# Patient Record
Sex: MALE | Race: WHITE | NOT HISPANIC OR LATINO | URBAN - METROPOLITAN AREA
[De-identification: names, ages, dates, MRNs, and addresses within clinical notes are randomized per-mention and may not be internally consistent; named-entity substitution may affect disease eponyms.]

---

## 2020-06-14 ENCOUNTER — INPATIENT (INPATIENT)
Facility: HOSPITAL | Age: 85
LOS: 1 days | Discharge: HOME | DRG: 694 | End: 2020-06-16
Attending: INTERNAL MEDICINE | Admitting: INTERNAL MEDICINE
Payer: MEDICARE

## 2020-06-14 VITALS
RESPIRATION RATE: 19 BRPM | WEIGHT: 220.02 LBS | OXYGEN SATURATION: 98 % | DIASTOLIC BLOOD PRESSURE: 87 MMHG | SYSTOLIC BLOOD PRESSURE: 168 MMHG | HEART RATE: 64 BPM | HEIGHT: 71 IN

## 2020-06-14 LAB
ALBUMIN SERPL ELPH-MCNC: 4.3 G/DL — SIGNIFICANT CHANGE UP (ref 3.3–5)
ALP SERPL-CCNC: 50 U/L — SIGNIFICANT CHANGE UP (ref 40–120)
ALT FLD-CCNC: 9 U/L — LOW (ref 10–45)
ANION GAP SERPL CALC-SCNC: 13 MMOL/L — SIGNIFICANT CHANGE UP (ref 5–17)
APPEARANCE UR: CLEAR — SIGNIFICANT CHANGE UP
AST SERPL-CCNC: 14 U/L — SIGNIFICANT CHANGE UP (ref 10–40)
BACTERIA # UR AUTO: NEGATIVE — SIGNIFICANT CHANGE UP
BASE EXCESS BLDV CALC-SCNC: -0.1 MMOL/L — SIGNIFICANT CHANGE UP (ref -2–2)
BASOPHILS # BLD AUTO: 0.05 K/UL — SIGNIFICANT CHANGE UP (ref 0–0.2)
BASOPHILS NFR BLD AUTO: 0.5 % — SIGNIFICANT CHANGE UP (ref 0–2)
BILIRUB SERPL-MCNC: 1 MG/DL — SIGNIFICANT CHANGE UP (ref 0.2–1.2)
BILIRUB UR-MCNC: NEGATIVE — SIGNIFICANT CHANGE UP
BUN SERPL-MCNC: 22 MG/DL — SIGNIFICANT CHANGE UP (ref 7–23)
CA-I SERPL-SCNC: 1.19 MMOL/L — SIGNIFICANT CHANGE UP (ref 1.12–1.3)
CALCIUM SERPL-MCNC: 9.5 MG/DL — SIGNIFICANT CHANGE UP (ref 8.4–10.5)
CHLORIDE BLDV-SCNC: 109 MMOL/L — HIGH (ref 96–108)
CHLORIDE SERPL-SCNC: 105 MMOL/L — SIGNIFICANT CHANGE UP (ref 96–108)
CO2 BLDV-SCNC: 24 MMOL/L — SIGNIFICANT CHANGE UP (ref 22–30)
CO2 SERPL-SCNC: 20 MMOL/L — LOW (ref 22–31)
COLOR SPEC: YELLOW — SIGNIFICANT CHANGE UP
CREAT SERPL-MCNC: 1.35 MG/DL — HIGH (ref 0.5–1.3)
DIFF PNL FLD: ABNORMAL
EOSINOPHIL # BLD AUTO: 0.1 K/UL — SIGNIFICANT CHANGE UP (ref 0–0.5)
EOSINOPHIL NFR BLD AUTO: 1 % — SIGNIFICANT CHANGE UP (ref 0–6)
EPI CELLS # UR: 1 /HPF — SIGNIFICANT CHANGE UP
GAS PNL BLDV: 139 MMOL/L — SIGNIFICANT CHANGE UP (ref 135–145)
GAS PNL BLDV: SIGNIFICANT CHANGE UP
GAS PNL BLDV: SIGNIFICANT CHANGE UP
GLUCOSE BLDV-MCNC: 151 MG/DL — HIGH (ref 70–99)
GLUCOSE SERPL-MCNC: 159 MG/DL — HIGH (ref 70–99)
GLUCOSE UR QL: NEGATIVE — SIGNIFICANT CHANGE UP
HCO3 BLDV-SCNC: 23 MMOL/L — SIGNIFICANT CHANGE UP (ref 21–29)
HCT VFR BLD CALC: 41.8 % — SIGNIFICANT CHANGE UP (ref 39–50)
HCT VFR BLDA CALC: 45 % — SIGNIFICANT CHANGE UP (ref 39–50)
HGB BLD CALC-MCNC: 14.8 G/DL — SIGNIFICANT CHANGE UP (ref 13–17)
HGB BLD-MCNC: 14.3 G/DL — SIGNIFICANT CHANGE UP (ref 13–17)
HYALINE CASTS # UR AUTO: 0 /LPF — SIGNIFICANT CHANGE UP (ref 0–2)
IMM GRANULOCYTES NFR BLD AUTO: 0.4 % — SIGNIFICANT CHANGE UP (ref 0–1.5)
KETONES UR-MCNC: SIGNIFICANT CHANGE UP
LACTATE BLDV-MCNC: 2.2 MMOL/L — HIGH (ref 0.7–2)
LEUKOCYTE ESTERASE UR-ACNC: NEGATIVE — SIGNIFICANT CHANGE UP
LIDOCAIN IGE QN: 33 U/L — SIGNIFICANT CHANGE UP (ref 7–60)
LYMPHOCYTES # BLD AUTO: 1.06 K/UL — SIGNIFICANT CHANGE UP (ref 1–3.3)
LYMPHOCYTES # BLD AUTO: 10.3 % — LOW (ref 13–44)
MCHC RBC-ENTMCNC: 31.1 PG — SIGNIFICANT CHANGE UP (ref 27–34)
MCHC RBC-ENTMCNC: 34.2 GM/DL — SIGNIFICANT CHANGE UP (ref 32–36)
MCV RBC AUTO: 90.9 FL — SIGNIFICANT CHANGE UP (ref 80–100)
MONOCYTES # BLD AUTO: 0.75 K/UL — SIGNIFICANT CHANGE UP (ref 0–0.9)
MONOCYTES NFR BLD AUTO: 7.3 % — SIGNIFICANT CHANGE UP (ref 2–14)
NEUTROPHILS # BLD AUTO: 8.32 K/UL — HIGH (ref 1.8–7.4)
NEUTROPHILS NFR BLD AUTO: 80.5 % — HIGH (ref 43–77)
NITRITE UR-MCNC: NEGATIVE — SIGNIFICANT CHANGE UP
NRBC # BLD: 0 /100 WBCS — SIGNIFICANT CHANGE UP (ref 0–0)
OTHER CELLS CSF MANUAL: 13 ML/DL — LOW (ref 18–22)
PCO2 BLDV: 36 MMHG — SIGNIFICANT CHANGE UP (ref 35–50)
PH BLDV: 7.42 — SIGNIFICANT CHANGE UP (ref 7.35–7.45)
PH UR: 7 — SIGNIFICANT CHANGE UP (ref 5–8)
PLATELET # BLD AUTO: 194 K/UL — SIGNIFICANT CHANGE UP (ref 150–400)
PO2 BLDV: 36 MMHG — SIGNIFICANT CHANGE UP (ref 25–45)
POTASSIUM BLDV-SCNC: 4.4 MMOL/L — SIGNIFICANT CHANGE UP (ref 3.5–5.3)
POTASSIUM SERPL-MCNC: 4.4 MMOL/L — SIGNIFICANT CHANGE UP (ref 3.5–5.3)
POTASSIUM SERPL-SCNC: 4.4 MMOL/L — SIGNIFICANT CHANGE UP (ref 3.5–5.3)
PROT SERPL-MCNC: 6.8 G/DL — SIGNIFICANT CHANGE UP (ref 6–8.3)
PROT UR-MCNC: ABNORMAL
RBC # BLD: 4.6 M/UL — SIGNIFICANT CHANGE UP (ref 4.2–5.8)
RBC # FLD: 13.7 % — SIGNIFICANT CHANGE UP (ref 10.3–14.5)
RBC CASTS # UR COMP ASSIST: 219 /HPF — HIGH (ref 0–4)
SAO2 % BLDV: 65 % — LOW (ref 67–88)
SARS-COV-2 RNA SPEC QL NAA+PROBE: SIGNIFICANT CHANGE UP
SODIUM SERPL-SCNC: 138 MMOL/L — SIGNIFICANT CHANGE UP (ref 135–145)
SP GR SPEC: 1.02 — SIGNIFICANT CHANGE UP (ref 1.01–1.02)
UROBILINOGEN FLD QL: NEGATIVE — SIGNIFICANT CHANGE UP
WBC # BLD: 10.32 K/UL — SIGNIFICANT CHANGE UP (ref 3.8–10.5)
WBC # FLD AUTO: 10.32 K/UL — SIGNIFICANT CHANGE UP (ref 3.8–10.5)
WBC UR QL: 7 /HPF — HIGH (ref 0–5)

## 2020-06-14 PROCEDURE — 71045 X-RAY EXAM CHEST 1 VIEW: CPT | Mod: 26

## 2020-06-14 PROCEDURE — 99220: CPT | Mod: CS

## 2020-06-14 PROCEDURE — 74176 CT ABD & PELVIS W/O CONTRAST: CPT | Mod: 26

## 2020-06-14 PROCEDURE — 76705 ECHO EXAM OF ABDOMEN: CPT | Mod: 26

## 2020-06-14 RX ORDER — SODIUM CHLORIDE 9 MG/ML
1000 INJECTION INTRAMUSCULAR; INTRAVENOUS; SUBCUTANEOUS
Refills: 0 | Status: DISCONTINUED | OUTPATIENT
Start: 2020-06-14 | End: 2020-06-16

## 2020-06-14 RX ORDER — ONDANSETRON 8 MG/1
4 TABLET, FILM COATED ORAL ONCE
Refills: 0 | Status: COMPLETED | OUTPATIENT
Start: 2020-06-14 | End: 2020-06-14

## 2020-06-14 RX ORDER — SODIUM CHLORIDE 9 MG/ML
3 INJECTION INTRAMUSCULAR; INTRAVENOUS; SUBCUTANEOUS EVERY 8 HOURS
Refills: 0 | Status: DISCONTINUED | OUTPATIENT
Start: 2020-06-14 | End: 2020-06-16

## 2020-06-14 RX ORDER — SODIUM CHLORIDE 9 MG/ML
1000 INJECTION INTRAMUSCULAR; INTRAVENOUS; SUBCUTANEOUS ONCE
Refills: 0 | Status: COMPLETED | OUTPATIENT
Start: 2020-06-14 | End: 2020-06-14

## 2020-06-14 RX ORDER — MORPHINE SULFATE 50 MG/1
4 CAPSULE, EXTENDED RELEASE ORAL ONCE
Refills: 0 | Status: DISCONTINUED | OUTPATIENT
Start: 2020-06-14 | End: 2020-06-14

## 2020-06-14 RX ORDER — KETOROLAC TROMETHAMINE 30 MG/ML
15 SYRINGE (ML) INJECTION ONCE
Refills: 0 | Status: DISCONTINUED | OUTPATIENT
Start: 2020-06-14 | End: 2020-06-14

## 2020-06-14 RX ADMIN — SODIUM CHLORIDE 125 MILLILITER(S): 9 INJECTION INTRAMUSCULAR; INTRAVENOUS; SUBCUTANEOUS at 15:40

## 2020-06-14 RX ADMIN — SODIUM CHLORIDE 3 MILLILITER(S): 9 INJECTION INTRAMUSCULAR; INTRAVENOUS; SUBCUTANEOUS at 22:48

## 2020-06-14 RX ADMIN — ONDANSETRON 4 MILLIGRAM(S): 8 TABLET, FILM COATED ORAL at 11:50

## 2020-06-14 RX ADMIN — MORPHINE SULFATE 4 MILLIGRAM(S): 50 CAPSULE, EXTENDED RELEASE ORAL at 10:02

## 2020-06-14 RX ADMIN — ONDANSETRON 4 MILLIGRAM(S): 8 TABLET, FILM COATED ORAL at 10:01

## 2020-06-14 RX ADMIN — Medication 15 MILLIGRAM(S): at 11:31

## 2020-06-14 RX ADMIN — SODIUM CHLORIDE 1000 MILLILITER(S): 9 INJECTION INTRAMUSCULAR; INTRAVENOUS; SUBCUTANEOUS at 10:02

## 2020-06-14 NOTE — ED ADULT NURSE REASSESSMENT NOTE - COMFORT CARE
ambulated to bathroom/plan of care explained/po fluids offered/warm blanket provided/darkened lights/repositioned

## 2020-06-14 NOTE — ED ADULT TRIAGE NOTE - SPO2 (%)
Last visit 6/10/19     Last refill on # 180 Prozac  with no refills on 7/1/19     Ok to refill again and any extra refills ?   Ready for signature.    98

## 2020-06-14 NOTE — ED CDU PROVIDER INITIAL DAY NOTE - OBJECTIVE STATEMENT
89 y.o. male history of CAD, MI, in the past, PPM coming in with left abdominal / left flank pain that woke him from sleep associated with nausea and several episodes of dry heaving.  NO fevers, no chills, no dysuria, no gross hematuria.  Never had surgery on his abdomen.  Never had anything like this before.  Took Tylenol without relief, nothing makes his symptoms worse.  In ED patient had mildly elevated creatinine of 1.35 without history of kidney disease in the past, with a mildly elevated lactate. UA unremarkable and CT showed mild to moderate L sided hydro with 2mm proximal ureteral stone. Patient sent to CDU for continued fluid hydration, pain control PRN and repeat labs.

## 2020-06-14 NOTE — ED PROVIDER NOTE - OBJECTIVE STATEMENT
89 y.o. male history of CAD, MI, in the past, PPM coming in with left abdominal / left flank pain that woke him from sleep associated with nausea and several episodes of dry heaving.  NO fevers, no chills, no dysuria, no gross hematuria.  Never had surgery on his abdomen.  Never had anything like this before.  Took Tylenol without relief, nothing makes his symptoms worse.

## 2020-06-14 NOTE — ED CDU PROVIDER INITIAL DAY NOTE - DETAILS
89M p/w L abdominal pain/flank pain x2 days with n/v, 2mm L ureteral stone  - continuous monitoring and frequent reevaluations  - IVF maintenance   - pain control PRN   - repeat BMP in AM   - d/w ED attending

## 2020-06-14 NOTE — ED ADULT NURSE REASSESSMENT NOTE - NS ED NURSE REASSESS COMMENT FT1
Received pt from ZIGGY Porter , received pt alert and responsive, oriented x4, denies any respiratory distress, SOB, or difficulty breathing. Pt transferred to CDU for abdominal pain/ L flank pain. Pt Is currently resting comfortably with no complaints. Pt has no difficulty with urination. Pending AM labs pt aware of plan.  IV in place, patent and free of signs of infiltration, V/S stable, pt afebrile, pt denies pain at this time. Pt educated on unit and unit rules, instructed patient to notify RN of any needed assistance, Pt verbalizes understanding, Call bell placed within reach. Safety maintained. Will continue to monitor.

## 2020-06-14 NOTE — ED ADULT NURSE REASSESSMENT NOTE - NS ED NURSE REASSESS COMMENT FT1
Pt given urinal, unable to urinate. MD [place] aware, OK with waiting to see if patient is able to go after finishing 1 L of NS. Will continue to monitor.

## 2020-06-14 NOTE — ED PROVIDER NOTE - ATTENDING CONTRIBUTION TO CARE
89 YOM PMH CAD on asa/plavix, HTN, PPM here with sudden onset of left flank/llq pain since 430a this morning. Woke him up from sleep. Pain is constant but comes in waves a/w dry heaving. Denies abd surgeries in the past. Denies recent travel/sick contacts. Took a tylenol around 5a with some relief. Denies f/c, vomiting, cp, sob, testicular pain, dysuria/hematuria, diarrhea.  AP - concern for renal colic vs. divertic. will get CT to evaluate. labs, pain control. reassess

## 2020-06-14 NOTE — ED ADULT NURSE REASSESSMENT NOTE - NS ED NURSE REASSESS COMMENT FT1
Pt returned from CT, reports improvement in pain, awaiting lab results. Will continue to monitor. Pt returned from CT, reports improvement in pain, awaiting CT results. Will continue to monitor.

## 2020-06-14 NOTE — ED ADULT NURSE NOTE - OBJECTIVE STATEMENT
The pt ia an 88 y/o M BIBMS coming from home, c/o 5/10 LLQ abd pain, nausea since 4:30 this morning. The pt states he has been dry heaving for one day and describes the pain as, "a nauseous feeling." Pt denies fever, chills, The pt ia an 88 y/o M PMH pacemaker on plavix and aspirin, sciatica, BIBMS coming from home, c/o 5/10 LLQ abd pain, nausea, generalized weakness since 4:30 this morning. The pt states he has been dry heaving for one day and describes the pain as, "a nauseous feeling." Pt took Tylenol at 5:30 am, but reports no pain relief. Pt denies fever, chills, vomiting, diarrhea, hematuria, urinary symptoms. Upon assessment, pt is AO x 4, clear breath sounds, s1 s2 heart sounds, abd soft and tender to the LLQ, bowel sounds present in all four quadrants, equal strength bilaterally. Fall precautions in place, side rails raised, call bell within reach, comfort measures provided, will continue to monitor.

## 2020-06-14 NOTE — ED CDU PROVIDER INITIAL DAY NOTE - PROGRESS NOTE DETAILS
CDU PROGRESS NOTE CHRISTINA LANE: Received pt at 1900 sign-out. Pt resting in stretcher in NAD. Case/plan reviewed. VSS. Pt is aox3, ambulatory around unit with steady gait. S1 S2 noted, RRR, lungs CTA b/l, BS x4 with soft, nontender abdomen. Pt without complaints, reports pa. Will continue to monitor overnight. CDU PROGRESS NOTE CHRISTINA LANE: Received pt at 1900 sign-out. Pt resting in stretcher in NAD. Case/plan reviewed. VSS. Pt is aox3, ambulatory around unit with steady gait. S1 S2 noted, RRR, lungs CTA b/l, BS x4 with soft, nontender abdomen. Pt without complaints, denies abdominal pain or nausea. Will continue to monitor overnight. CDU PROGRESS NOTE CHRISTINA LANE: Pt resting comfortably, feeling well without complaint. Pt reports having intermittent abdominal pain to left flank, but states he is currently without pain. Abdomen soft, non distended, no guarding or rebound noted. No CVAT. Pt declines pain medication at present time. Pt recommended to notified staff, if worsening pain or need for pain medication. CDU PROGRESS NOTE PA DOMINGO: Repeat Lactate from 2.2 to 1.4

## 2020-06-14 NOTE — ED ADULT NURSE NOTE - NSIMPLEMENTINTERV_GEN_ALL_ED
Implemented All Fall with Harm Risk Interventions:  [place] to call system. Call bell, personal items and telephone within reach. Instruct patient to call for assistance. Room bathroom lighting operational. Non-slip footwear when patient is off stretcher. Physically safe environment: no spills, clutter or unnecessary equipment. Stretcher in lowest position, wheels locked, appropriate side rails in place. Provide visual cue, wrist band, yellow gown, etc. Monitor gait and stability. Monitor for mental status changes and reorient to person, place, and time. Review medications for side effects contributing to fall risk. Reinforce activity limits and safety measures with patient and family. Provide visual clues: red socks.

## 2020-06-14 NOTE — ED CDU PROVIDER INITIAL DAY NOTE - ATTENDING CONTRIBUTION TO CARE
89 YOM PMH CAD, MI, PPM coming in with left flank pain that woke him up around 430a a/w episodes of dry heaving. Denies f/c, vomiting, cp, sob, dysuria, hematuria. Denies past abd surgeries.   In ED found to have 2mm proximal ureteral stone with mild hydro. requiring multiple rounds of IV pain control in ED. mildly elevated cr to 1.35 unclear baseline. CDU for continued fluid hydration, pain control

## 2020-06-15 DIAGNOSIS — N17.9 ACUTE KIDNEY FAILURE, UNSPECIFIED: ICD-10-CM

## 2020-06-15 LAB
ANION GAP SERPL CALC-SCNC: 11 MMOL/L — SIGNIFICANT CHANGE UP (ref 5–17)
BUN SERPL-MCNC: 24 MG/DL — HIGH (ref 7–23)
CALCIUM SERPL-MCNC: 8.7 MG/DL — SIGNIFICANT CHANGE UP (ref 8.4–10.5)
CHLORIDE SERPL-SCNC: 108 MMOL/L — SIGNIFICANT CHANGE UP (ref 96–108)
CO2 SERPL-SCNC: 23 MMOL/L — SIGNIFICANT CHANGE UP (ref 22–31)
CREAT SERPL-MCNC: 1.71 MG/DL — HIGH (ref 0.5–1.3)
CULTURE RESULTS: SIGNIFICANT CHANGE UP
GLUCOSE SERPL-MCNC: 133 MG/DL — HIGH (ref 70–99)
POTASSIUM SERPL-MCNC: 5.1 MMOL/L — SIGNIFICANT CHANGE UP (ref 3.5–5.3)
POTASSIUM SERPL-SCNC: 5.1 MMOL/L — SIGNIFICANT CHANGE UP (ref 3.5–5.3)
SODIUM SERPL-SCNC: 142 MMOL/L — SIGNIFICANT CHANGE UP (ref 135–145)
SPECIMEN SOURCE: SIGNIFICANT CHANGE UP

## 2020-06-15 PROCEDURE — 99217: CPT | Mod: CS

## 2020-06-15 RX ORDER — HEPARIN SODIUM 5000 [USP'U]/ML
5000 INJECTION INTRAVENOUS; SUBCUTANEOUS
Refills: 0 | Status: DISCONTINUED | OUTPATIENT
Start: 2020-06-15 | End: 2020-06-16

## 2020-06-15 RX ORDER — ACETAMINOPHEN 500 MG
650 TABLET ORAL EVERY 6 HOURS
Refills: 0 | Status: DISCONTINUED | OUTPATIENT
Start: 2020-06-15 | End: 2020-06-15

## 2020-06-15 RX ORDER — METOPROLOL TARTRATE 50 MG
25 TABLET ORAL DAILY
Refills: 0 | Status: DISCONTINUED | OUTPATIENT
Start: 2020-06-15 | End: 2020-06-16

## 2020-06-15 RX ORDER — CLOPIDOGREL BISULFATE 75 MG/1
75 TABLET, FILM COATED ORAL DAILY
Refills: 0 | Status: DISCONTINUED | OUTPATIENT
Start: 2020-06-15 | End: 2020-06-16

## 2020-06-15 RX ORDER — ACETAMINOPHEN 500 MG
650 TABLET ORAL EVERY 6 HOURS
Refills: 0 | Status: DISCONTINUED | OUTPATIENT
Start: 2020-06-15 | End: 2020-06-16

## 2020-06-15 RX ORDER — ONDANSETRON 8 MG/1
4 TABLET, FILM COATED ORAL ONCE
Refills: 0 | Status: COMPLETED | OUTPATIENT
Start: 2020-06-15 | End: 2020-06-15

## 2020-06-15 RX ORDER — SODIUM CHLORIDE 9 MG/ML
1000 INJECTION INTRAMUSCULAR; INTRAVENOUS; SUBCUTANEOUS ONCE
Refills: 0 | Status: COMPLETED | OUTPATIENT
Start: 2020-06-15 | End: 2020-06-15

## 2020-06-15 RX ORDER — TAMSULOSIN HYDROCHLORIDE 0.4 MG/1
0.4 CAPSULE ORAL AT BEDTIME
Refills: 0 | Status: DISCONTINUED | OUTPATIENT
Start: 2020-06-15 | End: 2020-06-16

## 2020-06-15 RX ORDER — OXYCODONE HYDROCHLORIDE 5 MG/1
5 TABLET ORAL EVERY 8 HOURS
Refills: 0 | Status: DISCONTINUED | OUTPATIENT
Start: 2020-06-15 | End: 2020-06-16

## 2020-06-15 RX ORDER — ASPIRIN/CALCIUM CARB/MAGNESIUM 324 MG
81 TABLET ORAL DAILY
Refills: 0 | Status: DISCONTINUED | OUTPATIENT
Start: 2020-06-15 | End: 2020-06-16

## 2020-06-15 RX ORDER — NITROGLYCERIN 6.5 MG
0.4 CAPSULE, EXTENDED RELEASE ORAL
Refills: 0 | Status: DISCONTINUED | OUTPATIENT
Start: 2020-06-15 | End: 2020-06-16

## 2020-06-15 RX ORDER — ONDANSETRON 8 MG/1
4 TABLET, FILM COATED ORAL EVERY 6 HOURS
Refills: 0 | Status: DISCONTINUED | OUTPATIENT
Start: 2020-06-15 | End: 2020-06-16

## 2020-06-15 RX ORDER — LOSARTAN POTASSIUM 100 MG/1
50 TABLET, FILM COATED ORAL DAILY
Refills: 0 | Status: DISCONTINUED | OUTPATIENT
Start: 2020-06-15 | End: 2020-06-16

## 2020-06-15 RX ORDER — HEPARIN SODIUM 5000 [USP'U]/ML
5000 INJECTION INTRAVENOUS; SUBCUTANEOUS
Refills: 0 | Status: DISCONTINUED | OUTPATIENT
Start: 2020-06-15 | End: 2020-06-15

## 2020-06-15 RX ADMIN — Medication 650 MILLIGRAM(S): at 08:59

## 2020-06-15 RX ADMIN — TAMSULOSIN HYDROCHLORIDE 0.4 MILLIGRAM(S): 0.4 CAPSULE ORAL at 01:20

## 2020-06-15 RX ADMIN — ONDANSETRON 4 MILLIGRAM(S): 8 TABLET, FILM COATED ORAL at 09:00

## 2020-06-15 RX ADMIN — SODIUM CHLORIDE 1000 MILLILITER(S): 9 INJECTION INTRAMUSCULAR; INTRAVENOUS; SUBCUTANEOUS at 09:00

## 2020-06-15 RX ADMIN — SODIUM CHLORIDE 3 MILLILITER(S): 9 INJECTION INTRAMUSCULAR; INTRAVENOUS; SUBCUTANEOUS at 21:13

## 2020-06-15 RX ADMIN — SODIUM CHLORIDE 3 MILLILITER(S): 9 INJECTION INTRAMUSCULAR; INTRAVENOUS; SUBCUTANEOUS at 06:48

## 2020-06-15 RX ADMIN — OXYCODONE HYDROCHLORIDE 5 MILLIGRAM(S): 5 TABLET ORAL at 08:59

## 2020-06-15 RX ADMIN — LOSARTAN POTASSIUM 50 MILLIGRAM(S): 100 TABLET, FILM COATED ORAL at 21:16

## 2020-06-15 RX ADMIN — SODIUM CHLORIDE 125 MILLILITER(S): 9 INJECTION INTRAMUSCULAR; INTRAVENOUS; SUBCUTANEOUS at 01:20

## 2020-06-15 RX ADMIN — Medication 25 MILLIGRAM(S): at 21:16

## 2020-06-15 RX ADMIN — CLOPIDOGREL BISULFATE 75 MILLIGRAM(S): 75 TABLET, FILM COATED ORAL at 21:16

## 2020-06-15 RX ADMIN — SODIUM CHLORIDE 125 MILLILITER(S): 9 INJECTION INTRAMUSCULAR; INTRAVENOUS; SUBCUTANEOUS at 21:16

## 2020-06-15 RX ADMIN — SODIUM CHLORIDE 125 MILLILITER(S): 9 INJECTION INTRAMUSCULAR; INTRAVENOUS; SUBCUTANEOUS at 13:43

## 2020-06-15 RX ADMIN — SODIUM CHLORIDE 3 MILLILITER(S): 9 INJECTION INTRAMUSCULAR; INTRAVENOUS; SUBCUTANEOUS at 13:44

## 2020-06-15 RX ADMIN — SODIUM CHLORIDE 1000 MILLILITER(S): 9 INJECTION INTRAMUSCULAR; INTRAVENOUS; SUBCUTANEOUS at 01:21

## 2020-06-15 RX ADMIN — Medication 81 MILLIGRAM(S): at 21:16

## 2020-06-15 NOTE — H&P ADULT - NSHPPHYSICALEXAM_GEN_ALL_CORE
PHYSICAL EXAMINATION:  Vital Signs Last 24 Hrs  T(C): 36.9 (15 Yvan 2020 07:35), Max: 37.1 (15 Yavn 2020 04:05)  T(F): 98.4 (15 Yvan 2020 07:35), Max: 98.7 (15 Yvan 2020 04:05)  HR: 60 (15 Yvan 2020 07:35) (58 - 69)  BP: 152/81 (15 Yvan 2020 07:35) (122/86 - 159/80)  BP(mean): --  RR: 18 (15 Yvan 2020 07:35) (16 - 18)  SpO2: 97% (15 Yvan 2020 07:35) (96% - 100%)  CAPILLARY BLOOD GLUCOSE          GENERAL: NAD, well-groomed, well-developed  HEAD:  atraumatic, normocephalic  EYES: sclera anicteric  ENMT: mucous membranes moist  NECK: supple, No JVD  CHEST/LUNG: clear to auscultation bilaterally; no rales, rhonchi, or wheezing b/l  HEART: normal S1, S2  ABDOMEN: BS+, soft, ND, NT L flank tender  EXTREMITIES:  pulses palpable; no clubbing, cyanosis, or edema b/l LEs  NEURO: awake, alert, interactive; moves all extremities  SKIN: no rashes or lesions

## 2020-06-15 NOTE — ED CDU PROVIDER SUBSEQUENT DAY NOTE - PROGRESS NOTE DETAILS
CDU PROGRESS NOTE PA DOMINGO: Pt resting comfortably, without complaint. Abdomen soft, non distended, no guarding or rebound noted. No CVAT. Will closely monitor, repeat Labs in am, PO challenge and reassess. CDU PROGRESS NOTE CHRISTINA LANE: Patient repeat laboratory worsening Cr 1.35 -1.71. Patient reports having nausea and feeling like he will vomit. Will medicate Zofran 4mg IVP. c/d/w Urology, will see patient. CDU PROGRESS NOTE CHRISTINA TIPTON: Pt resting comfortably, feeling well without complaint at this time. NAD, VSS. Seen by urology team at 0730. Reports that very unlikely patient would be stented given very small stone; however, given rising creatinine will discuss case with attending. CDU PROGRESS NOTE CHRISTINA TIPTON: Pt resting comfortably, feeling well without complaint at this time. NAD, VSS. Seen by urology team at 0730. Reports that very unlikely patient would be stented given very small stone; however, given rising creatinine will discuss case with attending. Patient reports that he has only urinated twice in past 24 hours, team informed.

## 2020-06-15 NOTE — ED CDU PROVIDER DISPOSITION NOTE - CLINICAL COURSE
9 y.o. male history of CAD, MI, in the past, PPM coming in with left abdominal / left flank pain that woke him from sleep associated with nausea and several episodes of dry heaving.  NO fevers, no chills, no dysuria, no gross hematuria.  Never had surgery on his abdomen.  Never had anything like this before.  Took Tylenol without relief, nothing makes his symptoms worse.  In ED patient had mildly elevated creatinine of 1.35 without history of kidney disease in the past, with a mildly elevated lactate. UA unremarkable and CT showed mild to moderate L sided hydro with 2mm proximal ureteral stone. Patient sent to CDU for continued fluid hydration, pain control PRN and repeat labs. Patient remained to have significant nausea in CDU with one episode of pain that resolved. In AM, patients creatinine elevated to 1.71 despite hydration and flomax. Patient evaluated by urology stating no surgical intervention at this time; however, given patient's worsening BARBIE and continued symptoms will admit to medicine for continued treatment. D/w Dr. House for admission.

## 2020-06-15 NOTE — H&P ADULT - ASSESSMENT
89 m with    L kidney stone/ Renal colic  - IVF  - pain control  - nausea control  - urology evaluation    CAD  - stable    BARBIE  - IVF  - follow Cr, lytes    Further action as per clinical course     Josep Pickard MD pager [phone]

## 2020-06-15 NOTE — H&P ADULT - NSHPLABSRESULTS_GEN_ALL_CORE
14.3   10.32 )-----------( 194      ( 2020 10:10 )             41.8       06-15    142  |  108  |  24<H>  ----------------------------<  133<H>  5.1   |  23  |  1.71<H>    Ca    8.7      15 Yvan 2020 05:04    TPro  6.8  /  Alb  4.3  /  TBili  1.0  /  DBili  x   /  AST  14  /  ALT  9<L>  /  AlkPhos  50  -14              Urinalysis Basic - ( 2020 12:16 )    Color: Yellow / Appearance: Clear / S.025 / pH: x  Gluc: x / Ketone: Trace  / Bili: Negative / Urobili: Negative   Blood: x / Protein: 100 mg/dL / Nitrite: Negative   Leuk Esterase: Negative / RBC: 219 /hpf / WBC 7 /HPF   Sq Epi: x / Non Sq Epi: 1 /hpf / Bacteria: Negative            Lactate Trend            CAPILLARY BLOOD GLUCOSE            Culture Results:   <10,000 CFU/mL Normal Urogenital Kathe ( @ 14:07)    < from: CT Abdomen and Pelvis No Cont (20 @ 10:42) >    IMPRESSION:   Mild to moderate left hydroureteronephrosis secondary to a 0.2 cm stone in the proximal left ureter.    < end of copied text >    < from: Xray Chest 1 View AP/PA (20 @ 10:14) >      Impression:    The heart is normal in size. The lungs are clear. A left phrenic angle is not included in this study. A pacer is in good position. No pneumothorax. No acute bony pathology is seen in the visualized bones.      < end of copied text >

## 2020-06-15 NOTE — CONSULT NOTE ADULT - ASSESSMENT
88yo male with left flank pain 2/2 2mm left proximal ureteral stone, pain now resolved. Intermittent nausea improved. Cr increased from 1.3 yesterday to 1.7 today. s/p toradol 15mg IV x2 and about 2L IVF over past day.   - strain urine  - continue hydration  - continue antiemetics and pain management  - no need for stent at this time  - will discuss with Dr. Dumont

## 2020-06-15 NOTE — CONSULT NOTE ADULT - SUBJECTIVE AND OBJECTIVE BOX
HPI: 88yo M h/o CAD, MI, in the past, PPM, presenting to ED yesterday with left flank pain and nausea. Took tylenol at home with no relief. States the nausea was very severe but no vomiting. could not tolerate much PO. Denies fever/chills, hematuria, dysuria, or difficulty emptying bladder. No h/o kidney stones in past.     CT shows 2mm proximal left ureteral stone. Pain currently controlled, but intermittent nausea. repeat cr 1.7 from 1.3 yesterday.   Pt received 1L IVF yesterday, currently completing another 1 L @125cc/hr.   Also received toradol 15mg IV x2 yesterday     PAST MEDICAL & SURGICAL HISTORY:  Pacemaker  Myocardial infarction  Coronary artery disease  No significant past surgical history    FAMILY HISTORY:  No pertinent family history in first degree relatives    SOCIAL HISTORY:   Tobacco hx:    MEDICATIONS  (STANDING):  sodium chloride 0.9% lock flush 3 milliLiter(s) IV Push every 8 hours  sodium chloride 0.9%. 1000 milliLiter(s) (125 mL/Hr) IV Continuous <Continuous>  tamsulosin 0.4 milliGRAM(s) Oral at bedtime    MEDICATIONS  (PRN):  acetaminophen   Tablet .. 650 milliGRAM(s) Oral every 6 hours PRN Moderate Pain (4 - 6)  oxyCODONE    IR 5 milliGRAM(s) Oral every 8 hours PRN Severe Pain (7 - 10)    Allergies    No Known Allergies    Intolerances        REVIEW OF SYSTEMS: Pertinent positives and negatives as stated in HPI, otherwise negative    Vital signs  T(C): 37.1 (06-15-20 @ 04:05), Max: 37.1 (06-15-20 @ 04:05)  HR: 59 (06-15-20 @ 04:05)  BP: 146/78 (06-15-20 @ 04:05)  SpO2: 96% (06-15-20 @ 04:05)  Wt(kg): --    Output    UOP    Physical Exam  Gen: NAD  Pulm: No respiratory distress, no subcostal retractions  CV: RRR, no JVD  Abd: Soft, NT, ND, no CVAT  : voiding     LABS:          06-15 @ 05:04    WBC --    / Hct --    / SCr 1.71      @ 10:10    WBC 10.32 / Hct 41.8  / SCr 1.35     06-15    142  |  108  |  24<H>  ----------------------------<  133<H>  5.1   |  23  |  1.71<H>    Ca    8.7      15 Yvan 2020 05:04    TPro  6.8  /  Alb  4.3  /  TBili  1.0  /  DBili  x   /  AST  14  /  ALT  9<L>  /  AlkPhos  50  06-14      Urinalysis Basic - ( 2020 12:16 )    Color: Yellow / Appearance: Clear / S.025 / pH: x  Gluc: x / Ketone: Trace  / Bili: Negative / Urobili: Negative   Blood: x / Protein: 100 mg/dL / Nitrite: Negative   Leuk Esterase: Negative / RBC: 219 /hpf / WBC 7 /HPF   Sq Epi: x / Non Sq Epi: 1 /hpf / Bacteria: Negative        RADIOLOGY:  < from: CT Abdomen and Pelvis No Cont (20 @ 10:42) >  FINDINGS:  LOWER CHEST: Pacemaker leads in place.  Coronary artery calcifications/stent    LIVER: Coronary artery calcifications/stent  BILE DUCTS: Normal caliber.  GALLBLADDER: Within normal limits.  SPLEEN: Within normal limits.  PANCREAS: Within normal limits.  ADRENALS: Within normal limits.  KIDNEYS/URETERS: Mild to moderate left hydroureteronephrosis secondary toa 0.2 cm stone in the proximal left ureter. Left perinephric inflammatory change.    BLADDER: Within normal limits.  REPRODUCTIVE ORGANS: Prostate is enlarged.    BOWEL: No bowel obstruction. Colonic diverticulosis. Appendix is not visualized. No evidence of inflammation in the pericecal region.  PERITONEUM: No ascites.  VESSELS: Atherosclerotic changes.  RETROPERITONEUM/LYMPH NODES: No lymphadenopathy.    ABDOMINAL WALL: Unremarkable.  BONES: Degenerative changes.    IMPRESSION:   Mild to moderate left hydroureteronephrosis secondary to a 0.2 cm stone in the proximal left ureter.      < end of copied text >

## 2020-06-15 NOTE — ED CDU PROVIDER DISPOSITION NOTE - ATTENDING CONTRIBUTION TO CARE
Patient admitted for proximal 2mm obstructing urolithiasis and uptrending Cr.  Labs/chart/Uro notes reviewed and appreciated.  Pain free, tolerating PO, c/o mild nausea.  HDS.  Lungs CTA, abd NTND.  Receiving IV fluids.  Will admit for continued Cr monitoring.  --BMM

## 2020-06-15 NOTE — H&P ADULT - NSHPSOCIALHISTORY_GEN_ALL_CORE
Social History:    Marital Status:  (   )    (  x ) Single    (   )    (  )   Occupation:   Lives with: (  ) alone  (  ) children   (  ) spouse   (  ) parents  (x  ) other    Substance Use (street drugs): ( x ) never used  (  ) other:  Tobacco Usage:  ( x  ) never smoked   (   ) former smoker   (   ) current smoker  (     ) pack years  (        ) last cigarette date  Alcohol Usage: denies    (     ) Advanced Directives: (     ) None    (      ) DNR    (     ) DNI    (     ) Health Care Proxy:

## 2020-06-16 ENCOUNTER — TRANSCRIPTION ENCOUNTER (OUTPATIENT)
Age: 85
End: 2020-06-16

## 2020-06-16 VITALS
OXYGEN SATURATION: 98 % | DIASTOLIC BLOOD PRESSURE: 72 MMHG | RESPIRATION RATE: 18 BRPM | HEART RATE: 60 BPM | SYSTOLIC BLOOD PRESSURE: 147 MMHG | TEMPERATURE: 98 F

## 2020-06-16 LAB
ANION GAP SERPL CALC-SCNC: 8 MMOL/L — SIGNIFICANT CHANGE UP (ref 5–17)
BUN SERPL-MCNC: 18 MG/DL — SIGNIFICANT CHANGE UP (ref 7–23)
CALCIUM SERPL-MCNC: 8.6 MG/DL — SIGNIFICANT CHANGE UP (ref 8.4–10.5)
CHLORIDE SERPL-SCNC: 109 MMOL/L — HIGH (ref 96–108)
CO2 SERPL-SCNC: 22 MMOL/L — SIGNIFICANT CHANGE UP (ref 22–31)
CREAT SERPL-MCNC: 1.16 MG/DL — SIGNIFICANT CHANGE UP (ref 0.5–1.3)
GLUCOSE SERPL-MCNC: 93 MG/DL — SIGNIFICANT CHANGE UP (ref 70–99)
HCT VFR BLD CALC: 36 % — LOW (ref 39–50)
HGB BLD-MCNC: 11.6 G/DL — LOW (ref 13–17)
MCHC RBC-ENTMCNC: 30.9 PG — SIGNIFICANT CHANGE UP (ref 27–34)
MCHC RBC-ENTMCNC: 32.2 GM/DL — SIGNIFICANT CHANGE UP (ref 32–36)
MCV RBC AUTO: 95.7 FL — SIGNIFICANT CHANGE UP (ref 80–100)
NRBC # BLD: 0 /100 WBCS — SIGNIFICANT CHANGE UP (ref 0–0)
PLATELET # BLD AUTO: 140 K/UL — LOW (ref 150–400)
POTASSIUM SERPL-MCNC: 4.7 MMOL/L — SIGNIFICANT CHANGE UP (ref 3.5–5.3)
POTASSIUM SERPL-SCNC: 4.7 MMOL/L — SIGNIFICANT CHANGE UP (ref 3.5–5.3)
RBC # BLD: 3.76 M/UL — LOW (ref 4.2–5.8)
RBC # FLD: 13.9 % — SIGNIFICANT CHANGE UP (ref 10.3–14.5)
SODIUM SERPL-SCNC: 139 MMOL/L — SIGNIFICANT CHANGE UP (ref 135–145)
WBC # BLD: 6.72 K/UL — SIGNIFICANT CHANGE UP (ref 3.8–10.5)
WBC # FLD AUTO: 6.72 K/UL — SIGNIFICANT CHANGE UP (ref 3.8–10.5)

## 2020-06-16 PROCEDURE — 83605 ASSAY OF LACTIC ACID: CPT

## 2020-06-16 PROCEDURE — 96375 TX/PRO/DX INJ NEW DRUG ADDON: CPT

## 2020-06-16 PROCEDURE — 85014 HEMATOCRIT: CPT

## 2020-06-16 PROCEDURE — G0378: CPT

## 2020-06-16 PROCEDURE — 81001 URINALYSIS AUTO W/SCOPE: CPT

## 2020-06-16 PROCEDURE — 82330 ASSAY OF CALCIUM: CPT

## 2020-06-16 PROCEDURE — 97161 PT EVAL LOW COMPLEX 20 MIN: CPT

## 2020-06-16 PROCEDURE — 82803 BLOOD GASES ANY COMBINATION: CPT

## 2020-06-16 PROCEDURE — 84132 ASSAY OF SERUM POTASSIUM: CPT

## 2020-06-16 PROCEDURE — 74176 CT ABD & PELVIS W/O CONTRAST: CPT

## 2020-06-16 PROCEDURE — 87086 URINE CULTURE/COLONY COUNT: CPT

## 2020-06-16 PROCEDURE — 84295 ASSAY OF SERUM SODIUM: CPT

## 2020-06-16 PROCEDURE — 83690 ASSAY OF LIPASE: CPT

## 2020-06-16 PROCEDURE — 76705 ECHO EXAM OF ABDOMEN: CPT

## 2020-06-16 PROCEDURE — 85027 COMPLETE CBC AUTOMATED: CPT

## 2020-06-16 PROCEDURE — 96361 HYDRATE IV INFUSION ADD-ON: CPT

## 2020-06-16 PROCEDURE — 82947 ASSAY GLUCOSE BLOOD QUANT: CPT

## 2020-06-16 PROCEDURE — 96374 THER/PROPH/DIAG INJ IV PUSH: CPT

## 2020-06-16 PROCEDURE — 96376 TX/PRO/DX INJ SAME DRUG ADON: CPT

## 2020-06-16 PROCEDURE — 71045 X-RAY EXAM CHEST 1 VIEW: CPT

## 2020-06-16 PROCEDURE — 99285 EMERGENCY DEPT VISIT HI MDM: CPT

## 2020-06-16 PROCEDURE — 82435 ASSAY OF BLOOD CHLORIDE: CPT

## 2020-06-16 PROCEDURE — 80048 BASIC METABOLIC PNL TOTAL CA: CPT

## 2020-06-16 PROCEDURE — 80053 COMPREHEN METABOLIC PANEL: CPT

## 2020-06-16 RX ORDER — ONDANSETRON 8 MG/1
4 TABLET, FILM COATED ORAL
Qty: 2 | Refills: 0
Start: 2020-06-16 | End: 2020-06-17

## 2020-06-16 RX ORDER — POLYETHYLENE GLYCOL 3350 17 G/17G
17 POWDER, FOR SOLUTION ORAL DAILY
Refills: 0 | Status: DISCONTINUED | OUTPATIENT
Start: 2020-06-16 | End: 2020-06-16

## 2020-06-16 RX ORDER — SENNA PLUS 8.6 MG/1
2 TABLET ORAL AT BEDTIME
Refills: 0 | Status: DISCONTINUED | OUTPATIENT
Start: 2020-06-16 | End: 2020-06-16

## 2020-06-16 RX ADMIN — POLYETHYLENE GLYCOL 3350 17 GRAM(S): 17 POWDER, FOR SOLUTION ORAL at 05:45

## 2020-06-16 RX ADMIN — SODIUM CHLORIDE 125 MILLILITER(S): 9 INJECTION INTRAMUSCULAR; INTRAVENOUS; SUBCUTANEOUS at 04:12

## 2020-06-16 RX ADMIN — SENNA PLUS 2 TABLET(S): 8.6 TABLET ORAL at 05:45

## 2020-06-16 RX ADMIN — SODIUM CHLORIDE 3 MILLILITER(S): 9 INJECTION INTRAMUSCULAR; INTRAVENOUS; SUBCUTANEOUS at 05:07

## 2020-06-16 RX ADMIN — SODIUM CHLORIDE 3 MILLILITER(S): 9 INJECTION INTRAMUSCULAR; INTRAVENOUS; SUBCUTANEOUS at 12:22

## 2020-06-16 NOTE — PHYSICAL THERAPY INITIAL EVALUATION ADULT - PRECAUTIONS/LIMITATIONS, REHAB EVAL
89 y.o. male history of CAD, MI, in the past, PPM coming in with left abdominal / left flank pain that woke him from sleep associated with nausea and several episodes of dry heaving.  NO fevers, no chills, no dysuria, no gross hematuria.  Never had surgery on his abdomen.  Never had anything like this before.  Took Tylenol without relief, nothing makes his symptoms worse. fall precautions/89 y.o. male history of CAD, MI, in the past, PPM coming in with left abdominal / left flank pain that woke him from sleep associated with nausea and several episodes of dry heaving.  NO fevers, no chills, no dysuria, no gross hematuria.  Never had surgery on his abdomen.  Never had anything like this before.  Took Tylenol without relief, nothing makes his symptoms worse.

## 2020-06-16 NOTE — DISCHARGE NOTE PROVIDER - HOSPITAL COURSE
· Assessment        89yo male with left flank pain 2/2 2mm left proximal ureteral stone, pain now resolved. Intermittent nausea improved.         - strain urine    - continue hydration    - continue antiemetics and pain management    - BARBIE resolved, stable for discharge    - Follow-up with [facility] in 3-5 weeks for ultrasound        [facility]    [address] [place] [id number] [phone] · Assessment        88yo male with left flank pain 2/2 2mm left proximal ureteral stone, pain now resolved. Intermittent nausea improved.         - strain urine    - continue hydration    - BARBIE resolved, stable for discharge    - Follow-up with [facility] in 3-5 weeks for ultrasound        [facility]    [address] [place], [place] [id number] [phone]

## 2020-06-16 NOTE — PHYSICAL THERAPY INITIAL EVALUATION ADULT - PLANNED THERAPY INTERVENTIONS, PT EVAL
balance training/gait training/strengthening/stair train GOAL : pt will negotiate a full flight of steps with HR step over step in 2 weeks independent

## 2020-06-16 NOTE — DISCHARGE NOTE PROVIDER - CARE PROVIDER_API CALL
Supriya Dumont  UROLOGY  [address] [place], [place] [id number]  Phone: [phone]  Fax: [phone]  Follow Up Time:

## 2020-06-16 NOTE — DISCHARGE NOTE PROVIDER - NSDCCPCAREPLAN_GEN_ALL_CORE_FT
PRINCIPAL DISCHARGE DIAGNOSIS  Diagnosis: Nephrolithiasis  Assessment and Plan of Treatment: strain  urine , continue hydration      SECONDARY DISCHARGE DIAGNOSES  Diagnosis: Nephrolithiasis  Assessment and Plan of Treatment:

## 2020-06-16 NOTE — PROGRESS NOTE ADULT - SUBJECTIVE AND OBJECTIVE BOX
afebrile, BARBIE resolved    T(F): 97.9, Max: 98.7 (06-15-20 @ 20:30)  HR: 60  BP: 144/74  SpO2: 98%    OUT:    Voided: 1350 mL  Total OUT: 1350 mL        Gen NAD  Abd soft, NTND   no CVAT      06-16 @ 06:50    WBC 6.72  / Hct 36.0  / SCr --       06-16 @ 06:49    WBC --    / Hct --    / SCr 1.16     .Urine Clean Catch (Midstream)  06-14  <10,000 CFU/mL Normal Urogenital Kathe

## 2020-06-16 NOTE — PROGRESS NOTE ADULT - ASSESSMENT
89 m with    L kidney stone/ Renal colic resolved  - pain control  - nausea control  - urology evaluation noted    CAD  - stable    BARBIE resolved  - follow Cr, lytes    DC home . Follow with PMD and Urology in 3-4 days. QA     Josep Pickard MD pager [phone]

## 2020-06-16 NOTE — DISCHARGE NOTE PROVIDER - NSDCMRMEDTOKEN_GEN_ALL_CORE_FT
Aspirin Enteric Coated 81 mg oral delayed release tablet: 1 tab(s) orally once a day  losartan 50 mg oral tablet: 1 tab(s) orally once a day  nitroglycerin 0.4 mg sublingual tablet: 1 tab(s) sublingual , As Needed  Plavix 75 mg oral tablet: 1 tab(s) orally once a day  Pravachol 40 mg oral tablet: 1 tab(s) orally once a day  Toprol-XL 25 mg oral tablet, extended release: 1 tab(s) orally once a day Aspirin Enteric Coated 81 mg oral delayed release tablet: 1 tab(s) orally once a day  losartan 50 mg oral tablet: 1 tab(s) orally once a day  nitroglycerin 0.4 mg sublingual tablet: 1 tab(s) sublingual , As Needed  ondansetron 4 mg oral tablet: 4 tab(s) orally once a day   Plavix 75 mg oral tablet: 1 tab(s) orally once a day  Pravachol 40 mg oral tablet: 1 tab(s) orally once a day  Toprol-XL 25 mg oral tablet, extended release: 1 tab(s) orally once a day

## 2020-06-16 NOTE — CHART NOTE - NSCHARTNOTEFT_GEN_A_CORE
As per Dr Pickard patient made ready for discharge.  Vital signs stable. Patient is stable and aware of plan for discharge.  Patient does not need any new medication for discharge.   Gaby Perrin ANP-C As per Dr Pickard patient made ready for discharge.  Vital signs stable. Patient is stable and aware of plan for discharge.  Patient does not need any new medication for discharge.   Gaby Perrin ANP-C    ADD: as per Urology pt may continue anti- emetics. I will send Zofran 4mg 1x/day 2 tab for nausea.   Pt may take Tylenol for pain. As per Dr Pickard patient made ready for discharge.  Vital signs stable. Patient is stable and aware of plan for discharge.  Patient does not need any new medication for discharge.   Gaby Perrin ANP-C    ADD: as per Urology pt may continue anti- emetics. I will send Zofran 4mg 1x/day 2 tab for nausea.   Pt may take Tylenol for pain.    ADD: I spoke to Pharmacist - clarified Zofran rx is 4mg # 2 tablets 1x/ day prn nausea , no refill.

## 2020-06-16 NOTE — PHYSICAL THERAPY INITIAL EVALUATION ADULT - ADDITIONAL COMMENTS
LIVES WITH SIGNIFICANT OTHER Eric Av [phone] in [facility] ; 1 FOS outside and chairlift to main floor of condo ; pt independent with amb PTA and adl's ; decline ID caregiver LIVES WITH SIGNIFICANT OTHER Eric Av [phone] in condo ; 1 FOS outside w/ HR and then chairlift to main floor of condo ; pt independent with amb PTA and adl's  no device ; decline ID caregiver

## 2020-06-16 NOTE — PHYSICAL THERAPY INITIAL EVALUATION ADULT - ASSISTIVE DEVICE FOR TRANSFER: GAIT, REHAB EVAL
no AD at first for 50 ft pt min unsteady throughout cg to close supervision ; pt then amb with std cane 100 ft x 2 steady gait independent

## 2020-06-16 NOTE — PHYSICAL THERAPY INITIAL EVALUATION ADULT - IMPAIRED TRANSFERS: SIT/STAND, REHAB EVAL
decreased strength/impaired balance/pt sit-stand x 3 steady at std cane independent , w/o device supervision

## 2020-06-16 NOTE — DISCHARGE NOTE NURSING/CASE MANAGEMENT/SOCIAL WORK - PATIENT PORTAL LINK FT
You can access the FollowMyHealth Patient Portal offered by [facility] by registering at the following website: [email or web address]. By joining [facility]’s FollowMyHealth portal, you will also be able to view your health information using other applications (apps) compatible with our system.

## 2020-06-16 NOTE — PHYSICAL THERAPY INITIAL EVALUATION ADULT - STAIR PATTERN, REHAB EVAL
step over step/step to step/pt indepenent with HR and std cane when do one step at a time step top step pt understood ; pt need cg of1 when try to go step over step mild unsteady with HR /std cane pt understood

## 2020-06-16 NOTE — PROGRESS NOTE ADULT - SUBJECTIVE AND OBJECTIVE BOX
Patient is a 89y old  Male who presents with a chief complaint of abdominal pain (16 Jun 2020 11:05)      SUBJECTIVE / OVERNIGHT EVENTS: Comfortable without new complaints. Passed stone during night.  Review of Systems  chest pain no  palpitations no  sob no  nausea no  headache no    MEDICATIONS  (STANDING):  aspirin enteric coated 81 milliGRAM(s) Oral daily  clopidogrel Tablet 75 milliGRAM(s) Oral daily  heparin   Injectable 5000 Unit(s) SubCutaneous two times a day  losartan 50 milliGRAM(s) Oral daily  metoprolol succinate ER 25 milliGRAM(s) Oral daily  sodium chloride 0.9% lock flush 3 milliLiter(s) IV Push every 8 hours  sodium chloride 0.9%. 1000 milliLiter(s) (125 mL/Hr) IV Continuous <Continuous>  tamsulosin 0.4 milliGRAM(s) Oral at bedtime    MEDICATIONS  (PRN):  acetaminophen   Tablet .. 650 milliGRAM(s) Oral every 6 hours PRN Moderate Pain (4 - 6)  nitroglycerin     SubLingual 0.4 milliGRAM(s) SubLingual every 5 minutes PRN Chest Pain  ondansetron Injectable 4 milliGRAM(s) IV Push every 6 hours PRN Nausea and/or Vomiting  oxyCODONE    IR 5 milliGRAM(s) Oral every 8 hours PRN Severe Pain (7 - 10)  polyethylene glycol 3350 17 Gram(s) Oral daily PRN Constipation  senna 2 Tablet(s) Oral at bedtime PRN Constipation      Vital Signs Last 24 Hrs  T(C): 36.7 (16 Jun 2020 12:39), Max: 37.1 (15 Yvan 2020 20:30)  T(F): 98 (16 Jun 2020 12:39), Max: 98.7 (15 Yvan 2020 20:30)  HR: 60 (16 Jun 2020 12:39) (60 - 72)  BP: 147/72 (16 Jun 2020 12:39) (130/72 - 165/85)  BP(mean): --  RR: 18 (16 Jun 2020 12:39) (17 - 18)  SpO2: 98% (16 Jun 2020 12:39) (95% - 98%)    PHYSICAL EXAM:  GENERAL: NAD, well-developed  HEAD:  Atraumatic, Normocephalic  EYES: EOMI, PERRLA, conjunctiva and sclera clear  NECK: Supple, No JVD  CHEST/LUNG: Clear to auscultation bilaterally; No wheeze  HEART: Regular rate and rhythm; No murmurs, rubs, or gallops  ABDOMEN: Soft, Nontender, Nondistended; Bowel sounds present  EXTREMITIES:  2+ Peripheral Pulses, No clubbing, cyanosis, or edema  PSYCH: AAOx3  NEUROLOGY: non-focal  SKIN: No rashes or lesions    LABS:                        11.6   6.72  )-----------( 140      ( 16 Jun 2020 06:50 )             36.0     06-16    139  |  109<H>  |  18  ----------------------------<  93  4.7   |  22  |  1.16    Ca    8.6      16 Jun 2020 06:49                Culture - Urine (collected 14 Jun 2020 14:07)  Source: .Urine Clean Catch (Midstream)  Final Report (15 Yvan 2020 09:56):    <10,000 CFU/mL Normal Urogenital Kathe        RADIOLOGY & ADDITIONAL TESTS:    Imaging Personally Reviewed:    Consultant(s) Notes Reviewed:      Care Discussed with Consultants/Other Providers:

## 2020-06-16 NOTE — PHYSICAL THERAPY INITIAL EVALUATION ADULT - GENERAL OBSERVATIONS, REHAB EVAL
pt received in bed nad no longer with flank pain ; pt report 6 weeks of sciatic discomfort not pain but feels a little weaker ; was doing virtual therapy on TV for therex at home

## 2020-06-16 NOTE — PHYSICAL THERAPY INITIAL EVALUATION ADULT - DISCHARGE DISPOSITION, PT EVAL
home w/ home PT/and transition to Outpt PT to work on balance, strength , increase fxl mobiltiy , fall prevention education continued

## 2020-06-16 NOTE — PHYSICAL THERAPY INITIAL EVALUATION ADULT - IMPAIRMENTS CONTRIBUTING TO GAIT DEVIATIONS, PT EVAL
impaired postural control/decreased strength/decreased endurance , see gait comments above/impaired balance

## 2020-06-16 NOTE — PROGRESS NOTE ADULT - ASSESSMENT
88yo male with left flank pain 2/2 2mm left proximal ureteral stone, pain now resolved. Intermittent nausea improved.     - strain urine  - continue hydration  - continue antiemetics and pain management  - BARBIE resolved, stable for discharge  - Follow-up with [facility] in 3-5 weeks for ultrasound    [facility]  [address] [place] [phone]

## 2020-06-16 NOTE — PHYSICAL THERAPY INITIAL EVALUATION ADULT - GAIT DEVIATIONS NOTED, PT EVAL
decreased el/increased time in double stance/decreased weight-shifting ability/decreased stride length/decreased step length

## 2020-06-17 PROBLEM — I21.9 ACUTE MYOCARDIAL INFARCTION, UNSPECIFIED: Chronic | Status: ACTIVE | Noted: 2020-06-14

## 2020-06-22 ENCOUNTER — APPOINTMENT (OUTPATIENT)
Dept: UROLOGY | Facility: CLINIC | Age: 85
End: 2020-06-22
Payer: MEDICARE

## 2020-06-22 VITALS
HEART RATE: 60 BPM | HEIGHT: 72 IN | WEIGHT: 200 LBS | SYSTOLIC BLOOD PRESSURE: 128 MMHG | BODY MASS INDEX: 27.09 KG/M2 | DIASTOLIC BLOOD PRESSURE: 73 MMHG

## 2020-06-22 VITALS — TEMPERATURE: 98.4 F

## 2020-06-22 DIAGNOSIS — Z86.79 PERSONAL HISTORY OF OTHER DISEASES OF THE CIRCULATORY SYSTEM: ICD-10-CM

## 2020-06-22 DIAGNOSIS — Z82.3 FAMILY HISTORY OF STROKE: ICD-10-CM

## 2020-06-22 DIAGNOSIS — I25.10 ATHEROSCLEROTIC HEART DISEASE OF NATIVE CORONARY ARTERY W/OUT ANGINA PECTORIS: ICD-10-CM

## 2020-06-22 PROCEDURE — 99204 OFFICE O/P NEW MOD 45 MIN: CPT

## 2020-06-22 NOTE — PHYSICAL EXAM
[General Appearance - Well Nourished] : well nourished [General Appearance - Well Developed] : well developed [General Appearance - In No Acute Distress] : no acute distress [Normal Appearance] : normal appearance [Well Groomed] : well groomed [Edema] : no peripheral edema [Respiration, Rhythm And Depth] : normal respiratory rhythm and effort [Exaggerated Use Of Accessory Muscles For Inspiration] : no accessory muscle use [Abdomen Soft] : soft [Costovertebral Angle Tenderness] : no ~M costovertebral angle tenderness [Abdomen Tenderness] : non-tender [Normal Station and Gait] : the gait and station were normal for the patient's age [] : no rash [Oriented To Time, Place, And Person] : oriented to person, place, and time

## 2020-06-22 NOTE — ASSESSMENT
[FreeTextEntry1] : \par \par Impression/plan: 89-year-old gentleman with 2 mm proximal ureteral stone.  He is currently without any symptoms.  Possibly may have passed stone.  He has not been straining so no evidence.  Recommend repeat CT scan in a week or 2 to establish passage of stone.  If he does become symptomatic in any way including pain, nausea, vomiting or fever chills he should either report to the office or directly to the emergency room.

## 2020-06-22 NOTE — HISTORY OF PRESENT ILLNESS
[FreeTextEntry1] : 89-year-old gentleman seen a little over a week ago for a 2 mm left proximal stone with some mild pain and nausea/vomiting.  He was observed in the emergency room and discharged after lab work showed creatinine at 1.16 and his nausea vomiting resolved.  His pain was also resolved.  Over the last week he has not been straining his urine, however states he no longer has pain or nausea/vomiting.  He denies any irritative or obstructive voiding symptoms.  He denies any fever chills.  Urine culture was negative.

## 2020-06-22 NOTE — REVIEW OF SYSTEMS
[Negative] : Heme/Lymph [Feeling Poorly] : feeling poorly [Feeling Tired] : feeling tired [Pain during urination] : pain during urination [Wake up at night to urinate  How many times?  ___] : wakes up to urinate [unfilled] times during the night [Diarrhea] : diarrhea [FreeTextEntry2] : neuropathy

## 2020-07-06 ENCOUNTER — OUTPATIENT (OUTPATIENT)
Dept: OUTPATIENT SERVICES | Facility: HOSPITAL | Age: 85
LOS: 1 days | End: 2020-07-06
Payer: MEDICARE

## 2020-07-06 ENCOUNTER — APPOINTMENT (OUTPATIENT)
Dept: CT IMAGING | Facility: IMAGING CENTER | Age: 85
End: 2020-07-06
Payer: MEDICARE

## 2020-07-06 ENCOUNTER — RESULT REVIEW (OUTPATIENT)
Age: 85
End: 2020-07-06

## 2020-07-06 DIAGNOSIS — Z00.00 ENCOUNTER FOR GENERAL ADULT MEDICAL EXAMINATION WITHOUT ABNORMAL FINDINGS: ICD-10-CM

## 2020-07-06 DIAGNOSIS — N20.0 CALCULUS OF KIDNEY: ICD-10-CM

## 2020-07-06 PROCEDURE — 74176 CT ABD & PELVIS W/O CONTRAST: CPT | Mod: 26

## 2020-07-06 PROCEDURE — 74176 CT ABD & PELVIS W/O CONTRAST: CPT

## 2020-07-07 ENCOUNTER — TRANSCRIPTION ENCOUNTER (OUTPATIENT)
Age: 85
End: 2020-07-07

## 2020-07-13 ENCOUNTER — APPOINTMENT (OUTPATIENT)
Dept: UROLOGY | Facility: CLINIC | Age: 85
End: 2020-07-13
Payer: MEDICARE

## 2020-07-13 VITALS — TEMPERATURE: 98.1 F

## 2020-07-13 VITALS — DIASTOLIC BLOOD PRESSURE: 72 MMHG | HEART RATE: 60 BPM | RESPIRATION RATE: 16 BRPM | SYSTOLIC BLOOD PRESSURE: 136 MMHG

## 2020-07-13 PROCEDURE — 99213 OFFICE O/P EST LOW 20 MIN: CPT

## 2020-08-04 ENCOUNTER — APPOINTMENT (OUTPATIENT)
Dept: ORTHOPEDIC SURGERY | Facility: CLINIC | Age: 85
End: 2020-08-04
Payer: MEDICARE

## 2020-08-04 VITALS
HEART RATE: 60 BPM | HEIGHT: 72 IN | WEIGHT: 200 LBS | DIASTOLIC BLOOD PRESSURE: 68 MMHG | BODY MASS INDEX: 27.09 KG/M2 | SYSTOLIC BLOOD PRESSURE: 127 MMHG

## 2020-08-04 VITALS — TEMPERATURE: 97.9 F

## 2020-08-04 DIAGNOSIS — M25.572 PAIN IN LEFT ANKLE AND JOINTS OF LEFT FOOT: ICD-10-CM

## 2020-08-04 DIAGNOSIS — M62.89 OTHER SPECIFIED DISORDERS OF MUSCLE: ICD-10-CM

## 2020-08-04 DIAGNOSIS — S99.912A UNSPECIFIED INJURY OF LEFT ANKLE, INITIAL ENCOUNTER: ICD-10-CM

## 2020-08-04 PROCEDURE — 73630 X-RAY EXAM OF FOOT: CPT | Mod: LT

## 2020-08-04 PROCEDURE — 99203 OFFICE O/P NEW LOW 30 MIN: CPT

## 2020-08-04 PROCEDURE — 73600 X-RAY EXAM OF ANKLE: CPT | Mod: LT

## 2020-08-09 PROBLEM — M62.89 TIGHTNESS OF BOTH GASTROCNEMIUS MUSCLES: Status: ACTIVE | Noted: 2020-08-09

## 2020-08-09 PROBLEM — S99.912A ANKLE INJURY, LEFT, INITIAL ENCOUNTER: Status: ACTIVE | Noted: 2020-08-09

## 2020-08-10 ENCOUNTER — APPOINTMENT (OUTPATIENT)
Dept: UROLOGY | Facility: CLINIC | Age: 85
End: 2020-08-10
Payer: MEDICARE

## 2020-08-10 VITALS — TEMPERATURE: 98.4 F

## 2020-08-10 PROCEDURE — 99213 OFFICE O/P EST LOW 20 MIN: CPT

## 2020-08-10 NOTE — HISTORY OF PRESENT ILLNESS
[FreeTextEntry1] : see notes from Dr. Dumont\par first episode of stones\par passed a 2mm stone\par no family history\par CT images reviewed: punctate non-obstructing left lower pole stone remaining.\par \par \par no need for surgical intervention\par Plan\par General kidney stone diet prevention discussed:\par Increase fluid intake to at least 2.5 liters of fluids a day\par Limit animal protein intake\par Low salt diet\par Printed guide/pamphlets provided\par f/u 6 months\par Renal sono with next visit\par

## 2021-01-14 ENCOUNTER — APPOINTMENT (OUTPATIENT)
Dept: PHYSICAL MEDICINE AND REHAB | Facility: CLINIC | Age: 86
End: 2021-01-14
Payer: MEDICARE

## 2021-01-14 VITALS
TEMPERATURE: 96.4 F | SYSTOLIC BLOOD PRESSURE: 127 MMHG | HEART RATE: 60 BPM | DIASTOLIC BLOOD PRESSURE: 61 MMHG | OXYGEN SATURATION: 96 %

## 2021-01-14 DIAGNOSIS — M54.16 RADICULOPATHY, LUMBAR REGION: ICD-10-CM

## 2021-01-14 PROCEDURE — 99203 OFFICE O/P NEW LOW 30 MIN: CPT

## 2021-01-14 NOTE — HISTORY OF PRESENT ILLNESS
[FreeTextEntry1] : Patient is an 89 year old man who presents for evaluation, accompanied by his wife. He reports recent episode of pain traveling down left posterior thigh to left calf, resolved with virtual PT, stretching at home, around that time, he also twisted his left ankle. He did in person physical therapy for the ankle, most of pain has resolved. Patient complains of worsening numbness in b/l feet, feet feel "padded", walks with stomping, feels tired and legs weak going up stairs. Left foot more numb than right, numbness is not painful, but feels worse since June. He denies falling, catching his toes when walking, but feels he has a limp, and he is looking down at feet or holding on to something when walking. No muscle weakness reported, but change in sensations. Patient was previously more active, these changes have limited his mobility. He does not use a cane. He does not take any medications for pain, no changes in bowel/bladder control, +recent weight loss 220-195 lbs.

## 2021-01-14 NOTE — ASSESSMENT
[FreeTextEntry1] : 89 year old man with symptoms of LE sensory neuropathy, radiculopathy\par MRI lumbar spine w/o contrast\par EMG LE to evaluate neuropathy\par patient to start PT for balance, evaluation for AD, HEP\par no medications were prescribed\par follow up in one month

## 2021-01-14 NOTE — PHYSICAL EXAM
[de-identified] : Alert, NAD [de-identified] : well perfused  [de-identified] : breathing comfortably [de-identified] : no spinal tenderness, no pain with lumbar flexion or extension  [de-identified] : strength intact, able to heel/toe walk with assist, slightly ataxic gait  [de-identified] : diminished reflexes, decreased proprioception on left, decreased sensation to light touch b/l feet, +romberg's  [de-identified] : normal affect

## 2021-01-14 NOTE — REVIEW OF SYSTEMS
[Fever] : no fever [Chills] : no chills [Fatigue] : no fatigue [Earache] : no earache [Chest Pain] : no chest pain [Palpitations] : no palpitations [Shortness Of Breath] : no shortness of breath [Wheezing] : no wheezing [Cough] : no cough [Abdominal Pain] : no abdominal pain [Incontinence] : no incontinence [Joint Pain] : no joint pain [Joint Stiffness] : no joint stiffness [Headache] : no headaches [Dizziness] : no dizziness [Fainting] : no fainting [Insomnia] : no insomnia [Anxiety] : no anxiety

## 2021-01-14 NOTE — PHYSICAL EXAM
[de-identified] : Alert, NAD [de-identified] : breathing comfortably [de-identified] : well perfused  [de-identified] : no spinal tenderness, no pain with lumbar flexion or extension  [de-identified] : strength intact, able to heel/toe walk with assist, slightly ataxic gait  [de-identified] : diminished reflexes, decreased proprioception on left, decreased sensation to light touch b/l feet, +romberg's  [de-identified] : normal affect

## 2021-01-21 ENCOUNTER — APPOINTMENT (OUTPATIENT)
Dept: PHYSICAL MEDICINE AND REHAB | Facility: CLINIC | Age: 86
End: 2021-01-21
Payer: MEDICARE

## 2021-01-21 VITALS
SYSTOLIC BLOOD PRESSURE: 127 MMHG | OXYGEN SATURATION: 100 % | DIASTOLIC BLOOD PRESSURE: 75 MMHG | HEART RATE: 60 BPM | TEMPERATURE: 97.2 F

## 2021-01-21 DIAGNOSIS — G57.92 UNSPECIFIED MONONEUROPATHY OF LEFT LOWER LIMB: ICD-10-CM

## 2021-01-21 PROCEDURE — 95909 NRV CNDJ TST 5-6 STUDIES: CPT

## 2021-02-10 ENCOUNTER — APPOINTMENT (OUTPATIENT)
Dept: UROLOGY | Facility: CLINIC | Age: 86
End: 2021-02-10

## 2021-11-19 NOTE — INPATIENT CERTIFICATION FOR MEDICARE PATIENTS - THE SEVERITY OF SIGNS/SYMPTOMS. (SEE ED/ADMIT DOCUMENTS)
Patient given water and crackers, pt able to have a bite of a cracker and states he is too nauseated to eat more  
1. The severity of signs/symptoms.(See ED/admit documents)

## 2022-06-07 NOTE — ED CDU PROVIDER SUBSEQUENT DAY NOTE - RESPIRATORY NEGATIVE STATEMENT, MLM
6yF with HbSS and recent admission for acute chest syndrome in 03/2022 presents with cough and chest pain. Cough began two days ago, is nonproductive, associated with chest pain and 1 episode of post-tussive emesis. Also complains of abdominal pain this morning with 2 episodes of watery diarrhea in the past 24 hours. Sick contact is cousin with similar cough. Denies other URI symptoms, signs of VOE. IUTD. Outpatient hematologist is Jose MARTINEZ.    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 No pets no smokers.    ED Course: tachypneic, tachycardic. WBC 12 Hgb 7.5 Hct 20.7. CMP bicarb 19. CXR b/l airspace opacities. RVP +paraflu. Started ceftriaxone and azithromycin, given 6cc/kg RBC transfusion. Ibuprofen ATC for pain.
no chest pain, no cough, and no shortness of breath.

## 2022-07-19 ENCOUNTER — APPOINTMENT (OUTPATIENT)
Dept: UROLOGY | Facility: CLINIC | Age: 87
End: 2022-07-19

## 2022-07-19 VITALS
DIASTOLIC BLOOD PRESSURE: 82 MMHG | OXYGEN SATURATION: 98 % | SYSTOLIC BLOOD PRESSURE: 154 MMHG | HEART RATE: 53 BPM | RESPIRATION RATE: 17 BRPM | HEIGHT: 69 IN | TEMPERATURE: 97.8 F

## 2022-07-19 PROCEDURE — 99214 OFFICE O/P EST MOD 30 MIN: CPT

## 2022-07-23 LAB
AFP-TM SERPL-MCNC: 2.7 NG/ML
ALBUMIN SERPL ELPH-MCNC: 4.5 G/DL
ALP BLD-CCNC: 54 U/L
ALT SERPL-CCNC: 21 U/L
ANION GAP SERPL CALC-SCNC: 14 MMOL/L
APPEARANCE: CLEAR
AST SERPL-CCNC: 26 U/L
BACTERIA UR CULT: NORMAL
BACTERIA: NEGATIVE
BASOPHILS # BLD AUTO: 0.05 K/UL
BASOPHILS NFR BLD AUTO: 0.8 %
BILIRUB SERPL-MCNC: 1 MG/DL
BILIRUBIN URINE: NEGATIVE
BLOOD URINE: NEGATIVE
BUN SERPL-MCNC: 21 MG/DL
CALCIUM SERPL-MCNC: 9.9 MG/DL
CHLORIDE SERPL-SCNC: 106 MMOL/L
CO2 SERPL-SCNC: 23 MMOL/L
COLOR: YELLOW
CREAT SERPL-MCNC: 1.15 MG/DL
EGFR: 60 ML/MIN/1.73M2
EOSINOPHIL # BLD AUTO: 0.21 K/UL
EOSINOPHIL NFR BLD AUTO: 3.2 %
GLUCOSE QUALITATIVE U: NEGATIVE
GLUCOSE SERPL-MCNC: 102 MG/DL
HCG-TM SERPL-MCNC: 2 MIU/ML
HCT VFR BLD CALC: 38.7 %
HGB BLD-MCNC: 12.6 G/DL
HYALINE CASTS: 1 /LPF
IMM GRANULOCYTES NFR BLD AUTO: 0.2 %
KETONES URINE: NEGATIVE
LEUKOCYTE ESTERASE URINE: NEGATIVE
LYMPHOCYTES # BLD AUTO: 1.05 K/UL
LYMPHOCYTES NFR BLD AUTO: 16.1 %
MAGNESIUM SERPL-MCNC: 2.1 MG/DL
MAN DIFF?: NORMAL
MCHC RBC-ENTMCNC: 31.2 PG
MCHC RBC-ENTMCNC: 32.6 GM/DL
MCV RBC AUTO: 95.8 FL
MICROSCOPIC-UA: NORMAL
MONOCYTES # BLD AUTO: 0.69 K/UL
MONOCYTES NFR BLD AUTO: 10.6 %
NEUTROPHILS # BLD AUTO: 4.5 K/UL
NEUTROPHILS NFR BLD AUTO: 69.1 %
NITRITE URINE: NEGATIVE
OSMOLALITY SERPL: 292 MOSMOL/KG
PH URINE: 5.5
PHOSPHATE SERPL-MCNC: 3.7 MG/DL
PLATELET # BLD AUTO: 190 K/UL
POTASSIUM SERPL-SCNC: 5.1 MMOL/L
PROT SERPL-MCNC: 6.6 G/DL
PROTEIN URINE: NORMAL
PSA FREE FLD-MCNC: 31 %
PSA FREE SERPL-MCNC: 0.74 NG/ML
PSA SERPL-MCNC: 2.42 NG/ML
RBC # BLD: 4.04 M/UL
RBC # FLD: 14.2 %
RED BLOOD CELLS URINE: 2 /HPF
SODIUM SERPL-SCNC: 142 MMOL/L
SPECIFIC GRAVITY URINE: 1.02
SQUAMOUS EPITHELIAL CELLS: 0 /HPF
URATE SERPL-MCNC: 5.2 MG/DL
URINE CYTOLOGY: NORMAL
UROBILINOGEN URINE: NORMAL
WBC # FLD AUTO: 6.51 K/UL
WHITE BLOOD CELLS URINE: 2 /HPF

## 2022-07-25 LAB
TESTOST FREE SERPL-MCNC: 0.8 PG/ML
TESTOST SERPL-MCNC: 78.7 NG/DL

## 2022-07-25 NOTE — ASSESSMENT
[FreeTextEntry1] : 07/19/2022:  is a 89 y/o M that presents today for an evaluation of enlarged testicle. He reports that his left testicle was became enlarged about 5 weeks ago. There is no pain associated with this. Denies any groin pain. Denied doing any heavy lifting at the time it started to occur. The pt was told by his cardiologist a few years ago, Dr.Gary Hernandez at [facility], that he has an enlarged prostate but this hasn't bothered him. He denies burning, gross hematuria, pushing, straining, urinary frequency, and urinary urgency. Has constipation but manages with with maalox. 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 Has 7 stents. Takes plavix. Pt was  but his wife passed away. He has been with a domestic partner for about 20 years. His partner has a hx of a lung transplant done at [place] and is currently hospitalized at [facility]. Pt is sexually active every once in a while. The patient does not have any significant health problems. Does have neuropathy. +PMHx of kidney stones, only one episode and passed the stone within a day and a half. Drinks about 2 500 ml water bottles a day, a 500 ml bottle of Gatorade, and one cup of coffee in 24 hours. 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 \par \par PE: Pt is circumcised. Most of the penile shaft is buried in upper part of the scrotum in the mons pubis. Varicocele on the left. Right testes is a normal size, about 18 cc, non tender, no masses, epididymis is non tender without masses. No right varicocele. No inguinal hernias. No femoral adenopathy. Left hemiscrotum is enlarged. It feels it has fluid in it, like a hydrocele. Posterior portion is palpable, non tender. Epididymis is non tender. I cannot feel the lateral or anterior aspects. Compliance is high but as you press further, it decreases rapidly. Consistent with a hydrocele. It is all non tender, not inflamed. Scrotal skin is not red or warm. \par \par The standing and bent over position was utilized for the CLARY. Digital rectal exam found no suspicious rectal masses. Normal seminal vesicles. Anal tone is normal. The prostate is non tender, with normal texture, discrete borders, and no nodules. It is a 35 gram transurethral resection size. No rectal mucosal lesions. No gross blood on the examining finger. \par \par The patient produced a urine sample which will be sent for urinalysis, urine cytology, and urine culture. \par \par Blood work today included alpha fetoprotein, CBC wit diff, CMP, HCG, lactate dehydrogenase, magnesium, serum osmolality, phosphorus, PSA profile, testosterone free and total, and uric acid serum. \par \par I advised the patient to increase his liquid intake by adding one more 500 ml water bottle to his daily consumption. \par \par I am sending the patient for a scrotal US. \par \par Patient will RTO after he obtains the scrotal US to review the results. \par \par Preparation, in person office visit, and coordination of care: 45 minutes.

## 2022-07-25 NOTE — PHYSICAL EXAM
[General Appearance - Well Developed] : well developed [General Appearance - Well Nourished] : well nourished [Normal Appearance] : normal appearance [Well Groomed] : well groomed [General Appearance - In No Acute Distress] : no acute distress [Edema] : no peripheral edema [Respiration, Rhythm And Depth] : normal respiratory rhythm and effort [Exaggerated Use Of Accessory Muscles For Inspiration] : no accessory muscle use [Abdomen Soft] : soft [Abdomen Tenderness] : non-tender [Costovertebral Angle Tenderness] : no ~M costovertebral angle tenderness [Normal Station and Gait] : the gait and station were normal for the patient's age [] : no rash [No Focal Deficits] : no focal deficits [Oriented To Time, Place, And Person] : oriented to person, place, and time [Affect] : the affect was normal [Mood] : the mood was normal [Not Anxious] : not anxious [No Palpable Adenopathy] : no palpable adenopathy [FreeTextEntry1] : PE: Pt is circumcised. Most of the penile shaft is buried in upper part of the scrotum in the mons pubis. Varicocele on the left. Right testes is a normal size, about 18 cc, non tender, no masses, epididymis is non tender without masses. No right varicocele. No inguinal hernias. No femoral adenopathy. Left hemiscrotum is enlarged. It feels it has fluid in it, like a hydrocele. Posterior portion is palpable, non tender. Epididymis is non tender. I cannot feel the lateral or anterior aspects. Compliance is high but as you press further , it decreases rapidly. Consistent with a hydrocele. It is all non tender, not inflamed. Scrotal skin is not red or warm. \par \par The standing and bent over position was utilized for the CLARY. Digital rectal exam found no suspicious rectal masses. Normal seminal vesicles. Anal tone is normal. The prostate is non tender, with normal texture, discrete borders, and no nodules. It is a 35 gram transurethral resection size. No rectal mucosal lesions. No gross blood on the examining finger.

## 2022-07-25 NOTE — ADDENDUM
[FreeTextEntry1] : This note was authored by Melita Hodges working as a scribe for Dr.Gary Gerber. I, Dr. Ten Gerber have reviewed the content of this note and confirm it is true and accurate. I personally performed the history and physical examination and made all the decisions 07/19/2022.

## 2022-07-25 NOTE — HISTORY OF PRESENT ILLNESS
[FreeTextEntry1] : 07/19/2022:  is a 89 y/o M that presents today for an evaluation of enlarged testicle. He reports that his left testicle was became enlarged about 5 weeks ago. There is no pain associated with this. Denies any groin pain. Denied doing any heavy lifting at the time it started to occur. The pt was told by his cardiologist a few years ago, Dr.Gary Hernandez at [facility], that he has an enlarged prostate but this hasn't bothered him. He denies burning, gross hematuria, pushing, straining, urinary frequency, and urinary urgency. Has constipation but manages with with maalox. 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 Has 7 stents. Takes plavix. Pt was  but his wife passed away. He has been with a domestic partner for about 20 years. His partner has a hx of a lung transplant done at [place] and is currently hospitalized at [facility]. Pt is sexually active every once in a while. The patient does not have any significant health problems. Does have neuropathy. +PMHx of kidney stones, only one episode and passed the stone within a day and a half. Drinks about 2 500 ml water bottles a day, a 500 ml bottle of Gatorade, and one cup of coffee in 24 hours. 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 \par

## 2022-07-27 ENCOUNTER — APPOINTMENT (OUTPATIENT)
Dept: ULTRASOUND IMAGING | Facility: CLINIC | Age: 87
End: 2022-07-27

## 2022-07-27 ENCOUNTER — OUTPATIENT (OUTPATIENT)
Dept: OUTPATIENT SERVICES | Facility: HOSPITAL | Age: 87
LOS: 1 days | End: 2022-07-27
Payer: MEDICARE

## 2022-07-27 DIAGNOSIS — N43.3 HYDROCELE, UNSPECIFIED: ICD-10-CM

## 2022-07-27 LAB — LDH SERPL-CCNC: 378 U/L

## 2022-07-27 PROCEDURE — 76870 US EXAM SCROTUM: CPT | Mod: 26

## 2022-07-27 PROCEDURE — 76870 US EXAM SCROTUM: CPT

## 2022-07-28 ENCOUNTER — APPOINTMENT (OUTPATIENT)
Dept: UROLOGY | Facility: CLINIC | Age: 87
End: 2022-07-28

## 2022-07-29 ENCOUNTER — APPOINTMENT (OUTPATIENT)
Dept: UROLOGY | Facility: CLINIC | Age: 87
End: 2022-07-29

## 2022-07-29 PROCEDURE — 99443: CPT | Mod: 95

## 2022-07-30 ENCOUNTER — APPOINTMENT (OUTPATIENT)
Dept: CT IMAGING | Facility: IMAGING CENTER | Age: 87
End: 2022-07-30

## 2022-07-30 ENCOUNTER — OUTPATIENT (OUTPATIENT)
Dept: OUTPATIENT SERVICES | Facility: HOSPITAL | Age: 87
LOS: 1 days | End: 2022-07-30
Payer: MEDICARE

## 2022-07-30 DIAGNOSIS — D49.59 NEOPLASM OF UNSPECIFIED BEHAVIOR OF OTHER GENITOURINARY ORGAN: ICD-10-CM

## 2022-07-30 PROCEDURE — 71260 CT THORAX DX C+: CPT

## 2022-07-30 PROCEDURE — 74178 CT ABD&PLV WO CNTR FLWD CNTR: CPT | Mod: 26,MH

## 2022-07-30 PROCEDURE — 71260 CT THORAX DX C+: CPT | Mod: 26,MH

## 2022-07-30 PROCEDURE — 99443: CPT | Mod: 95

## 2022-07-30 PROCEDURE — 74178 CT ABD&PLV WO CNTR FLWD CNTR: CPT

## 2022-07-31 NOTE — ASSESSMENT
[FreeTextEntry1] : 07/19/2022:  is a 89 y/o M that presents today for an evaluation of enlarged testicle. He reports that his left testicle was became enlarged about 5 weeks ago. There is no pain associated with this. Denies any groin pain. Denied doing any heavy lifting at the time it started to occur. The pt was told by his cardiologist a few years ago, Dr.Gary Hernandez at [facility], that he has an enlarged prostate but this hasn't bothered him. He denies burning, gross hematuria, pushing, straining, urinary frequency, and urinary urgency. Has constipation but manages with with maalox. 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 Has 7 stents. Takes plavix. Pt was  but his wife passed away. He has been with a domestic partner for about 20 years. His partner has a hx of a lung transplant done at [place] and is currently hospitalized at [facility]. Pt is sexually active every once in a while. The patient does not have any significant health problems. Does have neuropathy. +PMHx of kidney stones, only one episode and passed the stone within a day and a half. Drinks about 2 500 ml water bottles a day, a 500 ml bottle of Gatorade, and one cup of coffee in 24 hours. 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 \par \par PE: Pt is circumcised. Most of the penile shaft is buried in upper part of the scrotum in the mons pubis. Varicocele on the left. Right testes is a normal size, about 18 cc, non tender, no masses, epididymis is non tender without masses. No right varicocele. No inguinal hernias. No femoral adenopathy. Left hemiscrotum is enlarged. It feels it has fluid in it, like a hydrocele. Posterior portion is palpable, non tender. Epididymis is non tender. I cannot feel the lateral or anterior aspects. Compliance is high but as you press further, it decreases rapidly. Consistent with a hydrocele. It is all non tender, not inflamed. Scrotal skin is not red or warm. \par The standing and bent over position was utilized for the CLARY. Digital rectal exam found no suspicious rectal masses. Normal seminal vesicles. Anal tone is normal. The prostate is non tender, with normal texture, discrete borders, and no nodules. It is a 35 gram transurethral resection size. No rectal mucosal lesions. No gross blood on the examining finger. \par The patient produced a urine sample which will be sent for urinalysis, urine cytology, and urine culture. \par Blood work today included alpha fetoprotein, CBC wit diff, CMP, HCG, lactate dehydrogenase, magnesium, serum osmolality, phosphorus, PSA profile, testosterone free and total, and uric acid serum. \par I advised the patient to increase his liquid intake by adding one more 500 ml water bottle to his daily consumption. \par I am sending the patient for a scrotal US. \par Patient will RTO after he obtains the scrotal US to review the results. \par \par 07/28/2022:  was scheduled for a telehealth visit today. Unfortunately, I was not able to see the patient. Pt was called by my office staff and informed of this. He was very upset and says he has been trying to call my office for days and trying to get in touch with me. My office staff apologized and assured him he would be rescheduled for tomorrow. \par \par 07/29/2022:  presents today for a follow up audio only telehealth visit. He is accompanied by his daughter. I apologized profusely for not being able to see him yesterday and for his multiple attempts to reach me as I was unaware of these attempts. He was receptive and understood. Pt obtained a scrotal US on 7/27/2022. 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 Left testis measures 6.9 cm x 4.9 cm x 5.6 cm. Right testis is 4.3 cm x 2.1 cm x  2.6 cm. Multiple low-attenuation vascular right testicular foci. Differential diagnosis includes leukemia/lymphoma or metastasis from primary neoplasm such as prostate. Primary left testicular neoplasm may also be considered but less likely in view of the findings in the right testis. Alpha fetoprotein was within normal range. HCG tumor marker was 2 where it should be below 1. This is a slight elevation. Lactate dehydrogenase was substantially elevated. It was these findings that reinforced my request of the US that was ordered on day of physical exam. The patient's testosterone level was low, total 78.7 and free at 0.8. Pt originally noticed the testicle started to become enlarged about 6 weeks ago. It has been fairly stable in size, maybe some minimal growth. \par \par Clinical findings and US results were reviewed at length with the patient. Lab work of 7/18/2022 was explained it detail as well. While this could be a germ cell neoplasm, it is unlikely at his age. If it is spermatocytic seminoma, one does not see focal vascular lesions in contralateral testes. Prostate cancer can metastasize to testes and produce such a picture. I explained that it is quite possible that this is a leukemia or lymphoma. Given these possibilities, I believe the place to start is with a CT chest abdomen and pelvis to check for enlarged lymph nodes. If there was no sign of regional or distal spread I recommend inguinal orchiectomy to establish diagnosis. If there are bony metastases, I would also get NM bone scan. After explaining all of this, his daughter recapped what we discussed so she understood well. Pt's daughter is from [place] and wanted to understand the timeline of things. \par \par Patient will schedule a telehealth visit a few days after he obtains the CT so we can review and discuss the results. \par \par Duration of telephone visit: 22 minutes.

## 2022-07-31 NOTE — HISTORY OF PRESENT ILLNESS
[FreeTextEntry1] : 07/19/2022:  is a 89 y/o M that presents today for an evaluation of enlarged testicle. He reports that his left testicle was became enlarged about 5 weeks ago. There is no pain associated with this. Denies any groin pain. Denied doing any heavy lifting at the time it started to occur. The pt was told by his cardiologist a few years ago, Dr.Gary Hernandez at [facility], that he has an enlarged prostate but this hasn't bothered him. He denies burning, gross hematuria, pushing, straining, urinary frequency, and urinary urgency. Has constipation but manages with with maalox. 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 Has 7 stents. Takes plavix. Pt was  but his wife passed away. He has been with a domestic partner for about 20 years. His partner has a hx of a lung transplant done at [place] and is currently hospitalized at [facility]. Pt is sexually active every once in a while. The patient does not have any significant health problems. Does have neuropathy. +PMHx of kidney stones, only one episode and passed the stone within a day and a half. Drinks about 2 500 ml water bottles a day, a 500 ml bottle of Gatorade, and one cup of coffee in 24 hours. 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 \par \par 07/28/2022:  was scheduled for a telehealth visit today. Unfortunately, I was not able to see the patient. Pt was called by my office staff and informed of this. He was very upset and says he has been trying to call my office for days and trying to get in touch with me. My office staff apologized and assured him he would be rescheduled for tomorrow. \par \par 07/29/2022:  presents today for a follow up audio only telehealth visit. He is accompanied by his daughter. I apologized profusely for not being able to see him yesterday and for his multiple attempts to reach me as I was unaware of these attempts. He was receptive and understood. Pt obtained a scrotal US on 7/27/2022. 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 Left testis measures 6.9 cm x 4.9 cm x 5.6 cm. Right testis is 4.3 cm x 2.1 cm x  2.6 cm. Multiple low-attenuation vascular right testicular foci. Differential diagnosis includes leukemia/lymphoma or metastasis from primary neoplasm such as prostate. Primary left testicular neoplasm may also be considered but less likely in view of the findings in the right testis. Alpha fetoprotein was within normal range. HCG tumor marker was 2 where it should be below 1. This is a slight elevation. Lactate dehydrogenase was substantially elevated. It was these findings that reinforced my request of the US that was ordered on day of physical exam. The patient's testosterone level was low, total 78.7 and free at 0.8. Pt originally noticed the testicle started to become enlarged about 6 weeks ago. It has been fairly stable in size, maybe some minimal growth.

## 2022-07-31 NOTE — ADDENDUM
[FreeTextEntry1] : This note was authored by Melita Hodges working as a scribe for Dr.Gary Gerber. I, Dr. Ten Gerber have reviewed the content of this note and confirm it is true and accurate. I personally performed the history and physical examination and made all the decisions 07/29/2022.

## 2022-08-01 ENCOUNTER — APPOINTMENT (OUTPATIENT)
Dept: UROLOGY | Facility: CLINIC | Age: 87
End: 2022-08-01

## 2022-08-02 ENCOUNTER — APPOINTMENT (OUTPATIENT)
Dept: UROLOGY | Facility: CLINIC | Age: 87
End: 2022-08-02

## 2022-08-02 PROCEDURE — 99215 OFFICE O/P EST HI 40 MIN: CPT | Mod: 95

## 2022-08-02 PROCEDURE — G2212 PROLONG OUTPT/OFFICE VIS: CPT | Mod: 95

## 2022-08-02 NOTE — ADDENDUM
[FreeTextEntry1] : This note was authored by Melita Hodges working as a scribe for Dr.Gary Gerber. I, Dr. Ten Gerber have reviewed the content of this note and confirm it is true and accurate. I personally performed the history and physical examination and made all the decisions 08/02/2022.

## 2022-08-05 NOTE — HISTORY OF PRESENT ILLNESS
[FreeTextEntry1] : 08/2/2022: Patient and daughter in the patient's home gave consent to audiovisual telehealth visit. I, Ten Gerber MD, PhD was in my Office in [place], [place].\par \par see complete note in other note of same date.\par \par Re: MIRA BLACKMON (MIRA BLACKMON G) \par 28-Apr-1931(89y)M\par Left hydrocele precluded palpation of anterior and lateral aspect of enlarged left testis. 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 CLARY normal texture prostate, no nodules. PSA=2.42, KCZ=933 (), AFP=2.7 ng/ml (<=8.3,), HCG =2  mIU/ml ( <=1), CBC slight anemia, WBC count and differential Normal. CT Chest abdomen and pelvis no adenopathy, no metastases, no identifiable primary malignancy.\par \par Findings reviewed with patient and daughter. I suggested left radical orchiectomy with frozen section. If frozen section shows malignancy then right radical orchiectomy if benign biopsy right testis. Patient not interested in testicular prosthesis.\par \par Patient referred to Dr. Bruno Haywood.\par \par Ten Gerber\par

## 2022-08-05 NOTE — ASSESSMENT
[FreeTextEntry1] : 07/19/2022:  is a 89 y/o M that presents today for an evaluation of enlarged testicle. He reports that his left testicle was became enlarged about 5 weeks ago. There is no pain associated with this. Denies any groin pain. Denied doing any heavy lifting at the time it started to occur. The pt was told by his cardiologist a few years ago, Dr.Gary Hernandez at [facility], that he has an enlarged prostate but this hasn't bothered him. He denies burning, gross hematuria, pushing, straining, urinary frequency, and urinary urgency. Has constipation but manages with with maalox. 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 Has 7 stents. Takes plavix. Pt was  but his wife passed away. He has been with a domestic partner for about 20 years. His partner has a hx of a lung transplant done at [place] and is currently hospitalized at [facility]. Pt is sexually active every once in a while. The patient does not have any significant health problems. Does have neuropathy. +PMHx of kidney stones, only one episode and passed the stone within a day and a half. Drinks about 2 500 ml water bottles a day, a 500 ml bottle of Gatorade, and one cup of coffee in 24 hours. 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 \par \par PE: Pt is circumcised. Most of the penile shaft is buried in upper part of the scrotum in the mons pubis. Varicocele on the left. Right testes is a normal size, about 18 cc, non tender, no masses, epididymis is non tender without masses. No right varicocele. No inguinal hernias. No femoral adenopathy. Left hemiscrotum is enlarged. It feels it has fluid in it, like a hydrocele. Posterior portion is palpable, non tender. Epididymis is non tender. I cannot feel the lateral or anterior aspects. Compliance is high but as you press further, it decreases rapidly. Consistent with a hydrocele. It is all non tender, not inflamed. Scrotal skin is not red or warm. \par The standing and bent over position was utilized for the CLARY. Digital rectal exam found no suspicious rectal masses. Normal seminal vesicles. Anal tone is normal. The prostate is non tender, with normal texture, discrete borders, and no nodules. It is a 35 gram transurethral resection size. No rectal mucosal lesions. No gross blood on the examining finger. \par The patient produced a urine sample which will be sent for urinalysis, urine cytology, and urine culture. \par Blood work today included alpha fetoprotein, CBC wit diff, CMP, HCG, lactate dehydrogenase, magnesium, serum osmolality, phosphorus, PSA profile, testosterone free and total, and uric acid serum. \par I advised the patient to increase his liquid intake by adding one more 500 ml water bottle to his daily consumption. \par I am sending the patient for a scrotal US. \par Patient will RTO after he obtains the scrotal US to review the results. \par \par 07/28/2022:  was scheduled for a telehealth visit today. Unfortunately, I was not able to see the patient. Pt was called by my office staff and informed of this. He was very upset and says he has been trying to call my office for days and trying to get in touch with me. My office staff apologized and assured him he would be rescheduled for tomorrow.

## 2022-08-05 NOTE — ADDENDUM
[FreeTextEntry1] : This note was authored by Melita Hodges working as a scribe for Dr.Gary Gerber. I, Dr. Ten Gerber have reviewed the content of this note and confirm it is true and accurate. I personally performed the history and physical examination and made all the decisions 07/28/2022.

## 2022-08-05 NOTE — HISTORY OF PRESENT ILLNESS
[FreeTextEntry1] : 08/2/2022: Patient and daughter in the patient's home gave consent to audiovisual telehealth visit. I, Ten Gerber MD, PhD was in my Office in [place], [place].\par \par see complete note in other note of same date.\par \par Re: MIRA BLACKMON (MIRA BLACKMON G) \par 28-Apr-1931(89y)M\par Left hydrocele precluded palpation of anterior and lateral aspect of enlarged left testis. 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 CLARY normal texture prostate, no nodules. PSA=2.42, GZK=746 (), AFP=2.7 ng/ml (<=8.3,), HCG =2  mIU/ml ( <=1), CBC slight anemia, WBC count and differential Normal. CT Chest abdomen and pelvis no adenopathy, no metastases, no identifiable primary malignancy.\par \par Findings reviewed with patient and daughter. I suggested left radical orchiectomy with frozen section. If frozen section shows malignancy then right radical orchiectomy if benign biopsy right testis. Patient not interested in testicular prosthesis.\par \par Patient referred to Dr. Bruno Haywood.\par \par Ten Gerber\par

## 2022-08-05 NOTE — ASSESSMENT
[FreeTextEntry1] : 8/2/2022: \par \par Re: MIRA BLACKMON (MIRA BLACKMON) \par 28-Apr-1931(89y)M\par Left hydrocele precluded palpation of anterior and lateral aspect of enlarged left testis. 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 CLARY normal texture prostate, no nodules. PSA=2.42, PHH=233 (), AFP=2.7 ng/ml (<=8.3,), HCG =2  mIU/ml ( <=1), CBC slight anemia, WBC count and differential Normal. CT Chest abdomen and pelvis no adenopathy, no metastases, no identifiable primary malignancy.\par \par Findings reviewed with patient and daughter. I suggested left radical orchiectomy with frozen section. If frozen section shows malignancy then right radical orchiectomy if benign biopsy right testis. Patient not interested in testicular prosthesis.\par \par Patient referred to Dr. Bruno Haywood.\par \par Preparation, audiovisual telehealth visit, coordination of care took 75 minutes.\par \par Ten Gerber\par

## 2022-08-05 NOTE — ASSESSMENT
[FreeTextEntry1] : 8/2/2022: \par \par Re: MIRA BLACKMON (MIRA BLACKMON) \par 28-Apr-1931(89y)M\par Left hydrocele precluded palpation of anterior and lateral aspect of enlarged left testis. 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 CLARY normal texture prostate, no nodules. PSA=2.42, TYO=713 (), AFP=2.7 ng/ml (<=8.3,), HCG =2  mIU/ml ( <=1), CBC slight anemia, WBC count and differential Normal. CT Chest abdomen and pelvis no adenopathy, no metastases, no identifiable primary malignancy.\par \par Findings reviewed with patient and daughter. I suggested left radical orchiectomy with frozen section. If frozen section shows malignancy then right radical orchiectomy if benign biopsy right testis. Patient not interested in testicular prosthesis.\par \par Patient referred to Dr. Bruno Haywood.\par \par Preparation, audiovisual telehealth visit, coordination of care took 75 minutes.\par \par Ten Gerber\par

## 2022-08-05 NOTE — HISTORY OF PRESENT ILLNESS
[FreeTextEntry1] : 07/19/2022:  is a 89 y/o M that presents today for an evaluation of enlarged testicle. He reports that his left testicle was became enlarged about 5 weeks ago. There is no pain associated with this. Denies any groin pain. Denied doing any heavy lifting at the time it started to occur. The pt was told by his cardiologist a few years ago, Dr.Gary Hernandez at [facility], that he has an enlarged prostate but this hasn't bothered him. He denies burning, gross hematuria, pushing, straining, urinary frequency, and urinary urgency. Has constipation but manages with with maalox. 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 Has 7 stents. Takes plavix. Pt was  but his wife passed away. He has been with a domestic partner for about 20 years. His partner has a hx of a lung transplant done at [place] and is currently hospitalized at [facility]. Pt is sexually active every once in a while. The patient does not have any significant health problems. Does have neuropathy. +PMHx of kidney stones, only one episode and passed the stone within a day and a half. Drinks about 2 500 ml water bottles a day, a 500 ml bottle of Gatorade, and one cup of coffee in 24 hours. 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 \par \par 07/28/2022:  was scheduled for a telehealth visit today. Unfortunately, I was not able to see the patient. Pt was called by my office staff and informed of this. He was very upset and says he has been trying to call my office for days and trying to get in touch with me. My office staff apologized and assured him he would be rescheduled for tomorrow. \par

## 2022-08-08 ENCOUNTER — APPOINTMENT (OUTPATIENT)
Dept: UROLOGY | Facility: CLINIC | Age: 87
End: 2022-08-08

## 2022-08-08 VITALS
RESPIRATION RATE: 17 BRPM | BODY MASS INDEX: 27.09 KG/M2 | SYSTOLIC BLOOD PRESSURE: 144 MMHG | HEIGHT: 72 IN | WEIGHT: 200 LBS | DIASTOLIC BLOOD PRESSURE: 72 MMHG | HEART RATE: 60 BPM

## 2022-08-08 PROCEDURE — 99214 OFFICE O/P EST MOD 30 MIN: CPT

## 2022-08-08 RX ORDER — ROSUVASTATIN CALCIUM 20 MG/1
20 TABLET, FILM COATED ORAL
Qty: 90 | Refills: 0 | Status: ACTIVE | COMMUNITY
Start: 2022-02-01

## 2022-08-08 RX ORDER — LOSARTAN POTASSIUM 100 MG/1
TABLET, FILM COATED ORAL
Refills: 0 | Status: ACTIVE | COMMUNITY

## 2022-08-08 RX ORDER — METOPROLOL SUCCINATE 200 MG/1
TABLET, EXTENDED RELEASE ORAL
Refills: 0 | Status: ACTIVE | COMMUNITY

## 2022-08-08 RX ORDER — CLOPIDOGREL 75 MG/1
TABLET, FILM COATED ORAL
Refills: 0 | Status: ACTIVE | COMMUNITY

## 2022-08-08 RX ORDER — KETOROLAC TROMETHAMINE 10 MG/1
10 TABLET, FILM COATED ORAL 3 TIMES DAILY
Qty: 15 | Refills: 0 | Status: COMPLETED | COMMUNITY
Start: 2020-06-18 | End: 2022-08-08

## 2022-08-15 ENCOUNTER — APPOINTMENT (OUTPATIENT)
Dept: UROLOGY | Facility: CLINIC | Age: 87
End: 2022-08-15

## 2022-08-16 ENCOUNTER — OUTPATIENT (OUTPATIENT)
Dept: OUTPATIENT SERVICES | Facility: HOSPITAL | Age: 87
LOS: 1 days | End: 2022-08-16

## 2022-08-16 VITALS
RESPIRATION RATE: 14 BRPM | WEIGHT: 199.96 LBS | HEART RATE: 69 BPM | TEMPERATURE: 98 F | SYSTOLIC BLOOD PRESSURE: 126 MMHG | HEIGHT: 72 IN | DIASTOLIC BLOOD PRESSURE: 76 MMHG | OXYGEN SATURATION: 99 %

## 2022-08-16 DIAGNOSIS — N43.3 HYDROCELE, UNSPECIFIED: ICD-10-CM

## 2022-08-16 DIAGNOSIS — D49.59 NEOPLASM OF UNSPECIFIED BEHAVIOR OF OTHER GENITOURINARY ORGAN: ICD-10-CM

## 2022-08-16 LAB
ANION GAP SERPL CALC-SCNC: 10 MMOL/L — SIGNIFICANT CHANGE UP (ref 7–14)
BUN SERPL-MCNC: 23 MG/DL — SIGNIFICANT CHANGE UP (ref 7–23)
CALCIUM SERPL-MCNC: 9.8 MG/DL — SIGNIFICANT CHANGE UP (ref 8.4–10.5)
CHLORIDE SERPL-SCNC: 104 MMOL/L — SIGNIFICANT CHANGE UP (ref 98–107)
CO2 SERPL-SCNC: 26 MMOL/L — SIGNIFICANT CHANGE UP (ref 22–31)
CREAT SERPL-MCNC: 1.09 MG/DL — SIGNIFICANT CHANGE UP (ref 0.5–1.3)
EGFR: 64 ML/MIN/1.73M2 — SIGNIFICANT CHANGE UP
GLUCOSE SERPL-MCNC: 96 MG/DL — SIGNIFICANT CHANGE UP (ref 70–99)
HCT VFR BLD CALC: 37 % — LOW (ref 39–50)
HGB BLD-MCNC: 12.3 G/DL — LOW (ref 13–17)
MCHC RBC-ENTMCNC: 31.4 PG — SIGNIFICANT CHANGE UP (ref 27–34)
MCHC RBC-ENTMCNC: 33.2 GM/DL — SIGNIFICANT CHANGE UP (ref 32–36)
MCV RBC AUTO: 94.4 FL — SIGNIFICANT CHANGE UP (ref 80–100)
NRBC # BLD: 0 /100 WBCS — SIGNIFICANT CHANGE UP (ref 0–0)
NRBC # FLD: 0 K/UL — SIGNIFICANT CHANGE UP (ref 0–0)
PLATELET # BLD AUTO: 191 K/UL — SIGNIFICANT CHANGE UP (ref 150–400)
POTASSIUM SERPL-MCNC: 4 MMOL/L — SIGNIFICANT CHANGE UP (ref 3.5–5.3)
POTASSIUM SERPL-SCNC: 4 MMOL/L — SIGNIFICANT CHANGE UP (ref 3.5–5.3)
RBC # BLD: 3.92 M/UL — LOW (ref 4.2–5.8)
RBC # FLD: 14.2 % — SIGNIFICANT CHANGE UP (ref 10.3–14.5)
SODIUM SERPL-SCNC: 140 MMOL/L — SIGNIFICANT CHANGE UP (ref 135–145)
WBC # BLD: 6.17 K/UL — SIGNIFICANT CHANGE UP (ref 3.8–10.5)
WBC # FLD AUTO: 6.17 K/UL — SIGNIFICANT CHANGE UP (ref 3.8–10.5)

## 2022-08-16 PROCEDURE — 93010 ELECTROCARDIOGRAM REPORT: CPT

## 2022-08-16 RX ORDER — LOSARTAN POTASSIUM 100 MG/1
1 TABLET, FILM COATED ORAL
Qty: 0 | Refills: 0 | DISCHARGE

## 2022-08-16 RX ORDER — CLOPIDOGREL BISULFATE 75 MG/1
1 TABLET, FILM COATED ORAL
Qty: 0 | Refills: 0 | DISCHARGE

## 2022-08-16 RX ORDER — SODIUM CHLORIDE 9 MG/ML
1000 INJECTION, SOLUTION INTRAVENOUS
Refills: 0 | Status: DISCONTINUED | OUTPATIENT
Start: 2022-08-22 | End: 2022-08-23

## 2022-08-16 RX ORDER — METOPROLOL TARTRATE 50 MG
1 TABLET ORAL
Qty: 0 | Refills: 0 | DISCHARGE

## 2022-08-16 RX ORDER — ASPIRIN/CALCIUM CARB/MAGNESIUM 324 MG
1 TABLET ORAL
Qty: 0 | Refills: 0 | DISCHARGE

## 2022-08-16 NOTE — H&P PST ADULT - CARDIOVASCULAR
details… normal/regular rate and rhythm/S1 S2 present/no gallops/no rub/no murmur/no JVD/normal PMI/no pedal edema

## 2022-08-16 NOTE — H&P PST ADULT - PROBLEM SELECTOR PLAN 1
Pt scheduled for left radical orchiectomy, possible right orchiectomy on 8/22/2022.  labs done results pending, ekg done.  Pt instructed to obtain preop covid testing.  Preop teaching done, pt able to verbalize understanding.   medications day of procedure-  pepcid  cardiac stents-  pt instructed to continue asa, will f/u with plavix   anesthesia- AMIRA precautions  OR booking notified of pacemaker   pst request   cad cardiology eval - Dr. Ten Hernandez [phone]  echo 2022 [phone]  stress 2018 [phone]

## 2022-08-16 NOTE — H&P PST ADULT - BRAND OF COVID-19 VACCINATION
INDICATION: Carotid stenosis



CTA of the neck is performed after bolus intravenous

administration of iodinated contrast. 3-D reformatted images are

produced. Low-dose technique was utilized for radiation

reduction.



Imaging through the upper thorax reveals extensive centrilobular

emphysema. There is coarse calcification in the right apex

suggesting chronic scarring. There is atherosclerotic

calcification at the origins of great vessels. Normal

three-vessel branching pattern is present. There is also dense

atherosclerosis in the proximal right subclavian artery and at

the origin of right subclavian artery. Dense atherosclerotic

calcification in the distal right common carotid artery results

in narrowing of approximately 50% with marked atherosclerotic

calcification at the right carotid bulb resulting in probable

80-90% stenosis. There is moderate atherosclerotic calcification

within distal left common carotid artery and carotid bulb

resulting in probable 20% narrowing. Internal carotid arteries

are widely patent with mild atherosclerotic disease elsewhere in

the neck. Both vertebral arteries demonstrate mild distal

atherosclerotic calcification but reveal no high-grade stenosis

or occlusion. There is moderately severe cervical spondylosis.



IMPRESSION: Atherosclerotic disease results in high-grade, 80-90%

stenosis at the right carotid bulb and origin of right internal

carotid artery with atherosclerotic plaque into the left carotid

bifurcation resulting in 20% narrowing. Otherwise, mild

atherosclerotic disease also involves right common carotid and

right subclavian arteries without high-grade stenosis or

occlusion.



Dictated by: 



  Dictated on workstation # [id number]
Pfizer dose 1 and 2

## 2022-08-16 NOTE — H&P PST ADULT - HISTORY OF PRESENT ILLNESS
89y/o male scheduled for left radical orchiectomy, possible right orchiectomy on 8/22/2022.  Pt states, "developed left testicular swelling 3/2022, cat scan was done.  Now having removed." 89y/o male scheduled for left radical orchiectomy, possible right orchiectomy on 8/22/2022.  Developed left testicular swelling 3/2022, US shows bilateral testicular tumors. CT scan no clear evidence of metastatsis. Suspicion for underlying malignancy.  Now having removed.

## 2022-08-16 NOTE — H&P PST ADULT - RESPIRATORY
normal/clear to auscultation bilaterally/no wheezes/no rales/no rhonchi/no respiratory distress/no use of accessory muscles/no subcutaneous emphysema/breath sounds equal/good air movement/respirations non-labored

## 2022-08-16 NOTE — H&P PST ADULT - NSANTHOSAYNRD_GEN_A_CORE
No. AMIRA screening performed.  STOP BANG Legend: 0-2 = LOW Risk; 3-4 = INTERMEDIATE Risk; 5-8 = HIGH Risk

## 2022-08-17 ENCOUNTER — NON-APPOINTMENT (OUTPATIENT)
Age: 87
End: 2022-08-17

## 2022-08-20 LAB — SARS-COV-2 RNA SPEC QL NAA+PROBE: SIGNIFICANT CHANGE UP

## 2022-08-21 ENCOUNTER — TRANSCRIPTION ENCOUNTER (OUTPATIENT)
Age: 87
End: 2022-08-21

## 2022-08-22 ENCOUNTER — INPATIENT (INPATIENT)
Facility: HOSPITAL | Age: 87
LOS: 0 days | Discharge: HOME | End: 2022-08-23
Attending: UROLOGY | Admitting: UROLOGY

## 2022-08-22 ENCOUNTER — APPOINTMENT (OUTPATIENT)
Dept: UROLOGY | Facility: HOSPITAL | Age: 87
End: 2022-08-22

## 2022-08-22 ENCOUNTER — RESULT REVIEW (OUTPATIENT)
Age: 87
End: 2022-08-22

## 2022-08-22 VITALS
SYSTOLIC BLOOD PRESSURE: 106 MMHG | OXYGEN SATURATION: 96 % | TEMPERATURE: 97 F | HEART RATE: 59 BPM | DIASTOLIC BLOOD PRESSURE: 59 MMHG | RESPIRATION RATE: 16 BRPM | HEIGHT: 72 IN | WEIGHT: 199.96 LBS

## 2022-08-22 DIAGNOSIS — D49.59 NEOPLASM OF UNSPECIFIED BEHAVIOR OF OTHER GENITOURINARY ORGAN: ICD-10-CM

## 2022-08-22 PROCEDURE — 88367 INSITU HYBRIDIZATION AUTO: CPT | Mod: 26

## 2022-08-22 PROCEDURE — 88333 PATH CONSLTJ SURG CYTO XM 1: CPT | Mod: 26

## 2022-08-22 PROCEDURE — 88360 TUMOR IMMUNOHISTOCHEM/MANUAL: CPT | Mod: 26

## 2022-08-22 PROCEDURE — 88307 TISSUE EXAM BY PATHOLOGIST: CPT | Mod: 26

## 2022-08-22 PROCEDURE — 88341 IMHCHEM/IMCYTCHM EA ADD ANTB: CPT | Mod: 26,59

## 2022-08-22 PROCEDURE — 88342 IMHCHEM/IMCYTCHM 1ST ANTB: CPT | Mod: 26,59

## 2022-08-22 PROCEDURE — 88189 FLOWCYTOMETRY/READ 16 & >: CPT

## 2022-08-22 PROCEDURE — 54530 REMOVAL OF TESTIS: CPT | Mod: LT

## 2022-08-22 PROCEDURE — 88365 INSITU HYBRIDIZATION (FISH): CPT | Mod: 26,59

## 2022-08-22 RX ORDER — LOSARTAN POTASSIUM 100 MG/1
1 TABLET, FILM COATED ORAL
Qty: 0 | Refills: 0 | DISCHARGE

## 2022-08-22 RX ORDER — ASPIRIN/CALCIUM CARB/MAGNESIUM 324 MG
1 TABLET ORAL
Qty: 0 | Refills: 0 | DISCHARGE

## 2022-08-22 RX ORDER — NITROGLYCERIN 6.5 MG
0.4 CAPSULE, EXTENDED RELEASE ORAL
Refills: 0 | Status: DISCONTINUED | OUTPATIENT
Start: 2022-08-22 | End: 2022-08-23

## 2022-08-22 RX ORDER — ONDANSETRON 8 MG/1
4 TABLET, FILM COATED ORAL ONCE
Refills: 0 | Status: DISCONTINUED | OUTPATIENT
Start: 2022-08-22 | End: 2022-08-22

## 2022-08-22 RX ORDER — CEFAZOLIN SODIUM 1 G
1000 VIAL (EA) INJECTION EVERY 6 HOURS
Refills: 0 | Status: COMPLETED | OUTPATIENT
Start: 2022-08-22 | End: 2022-08-23

## 2022-08-22 RX ORDER — FENTANYL CITRATE 50 UG/ML
25 INJECTION INTRAVENOUS
Refills: 0 | Status: DISCONTINUED | OUTPATIENT
Start: 2022-08-22 | End: 2022-08-22

## 2022-08-22 RX ORDER — LOSARTAN POTASSIUM 100 MG/1
50 TABLET, FILM COATED ORAL DAILY
Refills: 0 | Status: DISCONTINUED | OUTPATIENT
Start: 2022-08-22 | End: 2022-08-23

## 2022-08-22 RX ORDER — ATORVASTATIN CALCIUM 80 MG/1
80 TABLET, FILM COATED ORAL AT BEDTIME
Refills: 0 | Status: DISCONTINUED | OUTPATIENT
Start: 2022-08-22 | End: 2022-08-23

## 2022-08-22 RX ORDER — METOPROLOL TARTRATE 50 MG
50 TABLET ORAL DAILY
Refills: 0 | Status: DISCONTINUED | OUTPATIENT
Start: 2022-08-22 | End: 2022-08-23

## 2022-08-22 RX ORDER — OXYCODONE HYDROCHLORIDE 5 MG/1
5 TABLET ORAL EVERY 6 HOURS
Refills: 0 | Status: DISCONTINUED | OUTPATIENT
Start: 2022-08-22 | End: 2022-08-23

## 2022-08-22 RX ORDER — SODIUM CHLORIDE 9 MG/ML
1000 INJECTION, SOLUTION INTRAVENOUS
Refills: 0 | Status: DISCONTINUED | OUTPATIENT
Start: 2022-08-22 | End: 2022-08-23

## 2022-08-22 RX ORDER — ROSUVASTATIN CALCIUM 5 MG/1
1 TABLET ORAL
Qty: 0 | Refills: 0 | DISCHARGE

## 2022-08-22 RX ORDER — ASPIRIN/CALCIUM CARB/MAGNESIUM 324 MG
81 TABLET ORAL DAILY
Refills: 0 | Status: DISCONTINUED | OUTPATIENT
Start: 2022-08-22 | End: 2022-08-23

## 2022-08-22 RX ORDER — HEPARIN SODIUM 5000 [USP'U]/ML
5000 INJECTION INTRAVENOUS; SUBCUTANEOUS EVERY 8 HOURS
Refills: 0 | Status: DISCONTINUED | OUTPATIENT
Start: 2022-08-22 | End: 2022-08-23

## 2022-08-22 RX ORDER — METOPROLOL TARTRATE 50 MG
1 TABLET ORAL
Qty: 0 | Refills: 0 | DISCHARGE

## 2022-08-22 RX ORDER — ACETAMINOPHEN 500 MG
650 TABLET ORAL EVERY 6 HOURS
Refills: 0 | Status: DISCONTINUED | OUTPATIENT
Start: 2022-08-22 | End: 2022-08-23

## 2022-08-22 RX ADMIN — Medication 100 MILLIGRAM(S): at 21:35

## 2022-08-22 RX ADMIN — Medication 50 MILLIGRAM(S): at 21:35

## 2022-08-22 RX ADMIN — SODIUM CHLORIDE 30 MILLILITER(S): 9 INJECTION, SOLUTION INTRAVENOUS at 13:53

## 2022-08-22 RX ADMIN — ATORVASTATIN CALCIUM 80 MILLIGRAM(S): 80 TABLET, FILM COATED ORAL at 21:35

## 2022-08-22 RX ADMIN — LOSARTAN POTASSIUM 50 MILLIGRAM(S): 100 TABLET, FILM COATED ORAL at 21:35

## 2022-08-22 RX ADMIN — Medication 81 MILLIGRAM(S): at 21:35

## 2022-08-22 RX ADMIN — HEPARIN SODIUM 5000 UNIT(S): 5000 INJECTION INTRAVENOUS; SUBCUTANEOUS at 21:34

## 2022-08-22 NOTE — PROGRESS NOTE ADULT - ASSESSMENT
A/P: 89y Male s/p left radical orchiectomy  DVT prophylaxis/OOB  Incentive spirometry  Strict I&O's  Analgesia and antiemetics as needed  Diet

## 2022-08-22 NOTE — ASU PREOP CHECKLIST - SPO2 (%)
"The patient is Stable - Low risk of patient condition declining or worsening    Shift Goals  Clinical Goals: comfort, resolve discharge barriers  Patient Goals: go home  Family Goals: determine discharge plan    Progress made toward(s) clinical / shift goals: Working on discharge plan, reassessment of pain and comfort often.    Patient is not progressing towards the following goals: Patient states she still feels \"not good\" when she is walking around.      Problem: Knowledge Deficit - Standard  Goal: Patient and family/care givers will demonstrate understanding of plan of care, disease process/condition, diagnostic tests and medications  Outcome: Progressing     Problem: Psychosocial  Goal: Patient's level of anxiety will decrease  Outcome: Progressing     Problem: Communication  Goal: The ability to communicate needs accurately and effectively will improve  Outcome: Progressing     Problem: Discharge Barriers/Planning  Goal: Patient's continuum of care needs are met  Outcome: Progressing     Problem: Hemodynamics  Goal: Patient's hemodynamics, fluid balance and neurologic status will be stable or improve  Outcome: Progressing     Problem: Respiratory  Goal: Patient will achieve/maintain optimum respiratory ventilation and gas exchange  Outcome: Progressing     Problem: Mobility  Goal: Patient's capacity to carry out activities will improve  Outcome: Progressing             " 96

## 2022-08-22 NOTE — PATIENT PROFILE ADULT - FALL HARM RISK - HARM RISK INTERVENTIONS
Assistance with ambulation/Assistance OOB with selected safe patient handling equipment/Communicate Risk of Fall with Harm to all staff/Discuss with provider need for PT consult/Monitor gait and stability/Provide patient with walking aids - walker, cane, crutches/Reinforce activity limits and safety measures with patient and family/Sit up slowly, dangle for a short time, stand at bedside before walking/Tailored Fall Risk Interventions/Use of alarms - bed, chair and/or voice tab/Visual Cue: Yellow wristband and red socks/Bed in lowest position, wheels locked, appropriate side rails in place/Call bell, personal items and telephone in reach/Instruct patient to call for assistance before getting out of bed or chair/Non-slip footwear when patient is out of bed/[place] to call system/Physically safe environment - no spills, clutter or unnecessary equipment/Purposeful Proactive Rounding/Room/bathroom lighting operational, light cord in reach

## 2022-08-22 NOTE — ASU PATIENT PROFILE, ADULT - NSICDXPASTMEDICALHX_GEN_ALL_CORE_FT
PAST MEDICAL HISTORY:  Coronary artery disease cardiac stents X7 2004- 2008    Hydrocele     Hyperlipidemia     Hypertension     Myocardial infarction     Pacemaker medtronic serial [id number] 2011

## 2022-08-22 NOTE — ASU PATIENT PROFILE, ADULT - FALL HARM RISK - HARM RISK INTERVENTIONS

## 2022-08-23 ENCOUNTER — TRANSCRIPTION ENCOUNTER (OUTPATIENT)
Age: 87
End: 2022-08-23

## 2022-08-23 VITALS
RESPIRATION RATE: 16 BRPM | SYSTOLIC BLOOD PRESSURE: 106 MMHG | DIASTOLIC BLOOD PRESSURE: 50 MMHG | OXYGEN SATURATION: 99 % | HEART RATE: 60 BPM | TEMPERATURE: 98 F

## 2022-08-23 RX ORDER — ACETAMINOPHEN 500 MG
975 TABLET ORAL ONCE
Refills: 0 | Status: COMPLETED | OUTPATIENT
Start: 2022-08-23 | End: 2022-08-23

## 2022-08-23 RX ORDER — NITROGLYCERIN 6.5 MG
1 CAPSULE, EXTENDED RELEASE ORAL
Qty: 0 | Refills: 0 | DISCHARGE

## 2022-08-23 RX ORDER — CLOPIDOGREL BISULFATE 75 MG/1
1 TABLET, FILM COATED ORAL
Qty: 0 | Refills: 0 | DISCHARGE

## 2022-08-23 RX ORDER — ACETAMINOPHEN 500 MG
2 TABLET ORAL
Qty: 0 | Refills: 0 | DISCHARGE

## 2022-08-23 RX ORDER — IBUPROFEN 200 MG
1 TABLET ORAL
Qty: 0 | Refills: 0 | DISCHARGE

## 2022-08-23 RX ORDER — TAMSULOSIN HYDROCHLORIDE 0.4 MG/1
1 CAPSULE ORAL
Qty: 30 | Refills: 0
Start: 2022-08-23 | End: 2022-09-21

## 2022-08-23 RX ADMIN — Medication 975 MILLIGRAM(S): at 11:13

## 2022-08-23 RX ADMIN — HEPARIN SODIUM 5000 UNIT(S): 5000 INJECTION INTRAVENOUS; SUBCUTANEOUS at 05:43

## 2022-08-23 RX ADMIN — Medication 100 MILLIGRAM(S): at 04:23

## 2022-08-23 RX ADMIN — Medication 975 MILLIGRAM(S): at 11:05

## 2022-08-23 NOTE — DISCHARGE NOTE NURSING/CASE MANAGEMENT/SOCIAL WORK - NSDCPEFALRISK_GEN_ALL_CORE
For information on Fall & Injury Prevention, visit: [email or web address] OR  [email or web address] OR  https://www.cdc.gov/steadi/patient.html

## 2022-08-23 NOTE — PROGRESS NOTE ADULT - SUBJECTIVE AND OBJECTIVE BOX
POD #1  Afeb 106/52 60 99%RA    Pt has no c/o  Abd- soft NT ND      inc C&D  Scrotal inc. - C&D  Void - 750
 Post op Check    Pt seen and examined without complaints. Pain is controlled. Denies SOB/CP/N/V.     Vital Signs Last 24 Hrs  T(C): 36.5 (22 Aug 2022 20:07), Max: 36.7 (22 Aug 2022 19:00)  T(F): 97.7 (22 Aug 2022 20:07), Max: 98 (22 Aug 2022 19:00)  HR: 58 (22 Aug 2022 20:07) (58 - 64)  BP: 128/86 (22 Aug 2022 20:07) (106/59 - 133/58)  BP(mean): 71 (22 Aug 2022 19:30) (70 - 627)  RR: 17 (22 Aug 2022 20:07) (14 - 19)  SpO2: 100% (22 Aug 2022 20:07) (96% - 100%)    Parameters below as of 22 Aug 2022 20:07  Patient On (Oxygen Delivery Method): room air        I&O's Summary    22 Aug 2022 07:01  -  22 Aug 2022 20:54  --------------------------------------------------------  IN: 450 mL / OUT: 0 mL / NET: 450 mL        Physical Exam  Gen: NAD, A&Ox3  Pulm: No respiratory distress, no subcostal retractions  CV: RRR  Abd: Soft, NT, ND  : + incision in left inguinal area with dermabond, + dressing in left scrotum c/d/i

## 2022-08-23 NOTE — DISCHARGE NOTE NURSING/CASE MANAGEMENT/SOCIAL WORK - PATIENT PORTAL LINK FT
You can access the FollowMyHealth Patient Portal offered by [facility] by registering at the following website: [email or web address]. By joining [facility]’s FollowMyHealth portal, you will also be able to view your health information using other applications (apps) compatible with our system.

## 2022-08-23 NOTE — PROGRESS NOTE ADULT - ASSESSMENT
A/P: 89y Male s/p left radical orchiectomy  POD#1 - doing well  Plan:  - OOB  - home later  - PVR

## 2022-08-23 NOTE — DISCHARGE NOTE NURSING/CASE MANAGEMENT/SOCIAL WORK - NSDPDISTO_GEN_ALL_CORE
stable, left scrotal dressing intact, left groin dermabond intact, tolerating diet, oob, voiding adequately/Home

## 2022-08-23 NOTE — DISCHARGE NOTE PROVIDER - NSDCCPCAREPLAN_GEN_ALL_CORE_FT
PRINCIPAL DISCHARGE DIAGNOSIS  Diagnosis: Status post orchiectomy  Assessment and Plan of Treatment: Drink plenty of fluids.   Re-start plavix Thursday  Call 's  office  to schedule a follow up appointment.  Call the office if you have fever greater than 101, difficulty urinating, pain not relieved with pain medication, nausea/vomiting.

## 2022-08-23 NOTE — DISCHARGE NOTE PROVIDER - CARE PROVIDER_API CALL
Cory Thrasher)  Urology  [address] [place] [address], [place] [address]  Phone: [phone]  Fax: [phone]  Follow Up Time:

## 2022-08-23 NOTE — DISCHARGE NOTE PROVIDER - NSDCMRMEDTOKEN_GEN_ALL_CORE_FT
Aspirin Enteric Coated 81 mg oral delayed release tablet: 1 tab(s) orally once a day (in the evening)  losartan 50 mg oral tablet: 1 tab(s) orally once a day (in the evening)  metoprolol succinate 50 mg oral capsule, extended release: 1 cap(s) orally once a day (in the evening)  nitroglycerin 0.4 mg sublingual tablet: 1 tab(s) sublingual , As Needed  Plavix 75 mg oral tablet: 1 tab(s) orally once a day (in the evening)  rosuvastatin 20 mg oral tablet: 1 tab(s) orally once a day (in the evening)   Aspirin Enteric Coated 81 mg oral delayed release tablet: 1 tab(s) orally once a day (in the evening)  losartan 50 mg oral tablet: 1 tab(s) orally once a day (in the evening)  metoprolol succinate 50 mg oral capsule, extended release: 1 cap(s) orally once a day (in the evening)  Motrin 400 mg oral tablet: 1 tab(s) orally every 6 hours, As Needed  nitroglycerin 0.4 mg sublingual tablet: 1 tab(s) sublingual once a day, As Needed  rosuvastatin 20 mg oral tablet: 1 tab(s) orally once a day (in the evening)  Tylenol 325 mg oral tablet: 2 tab(s) orally every 6 hours, As Needed   Aspirin Enteric Coated 81 mg oral delayed release tablet: 1 tab(s) orally once a day (in the evening)  Flomax 0.4 mg oral capsule: 1 cap(s) orally once a day (at bedtime)   losartan 50 mg oral tablet: 1 tab(s) orally once a day (in the evening)  metoprolol succinate 50 mg oral capsule, extended release: 1 cap(s) orally once a day (in the evening)  Motrin 400 mg oral tablet: 1 tab(s) orally every 6 hours, As Needed  nitroglycerin 0.4 mg sublingual tablet: 1 tab(s) sublingual once a day, As Needed  rosuvastatin 20 mg oral tablet: 1 tab(s) orally once a day (in the evening)  Tylenol 325 mg oral tablet: 2 tab(s) orally every 6 hours, As Needed

## 2022-08-23 NOTE — DISCHARGE NOTE NURSING/CASE MANAGEMENT/SOCIAL WORK - NSDCPNINST_GEN_ALL_CORE
Notify Dr Thrasher if you experience any increase in pain not relieved with medication, any redness, drainage or swelling around incision or if you develop a fever >100.5.  drink plenty of fluids. no heavy lifting or straining.  Use over the counter stool softeners to assist with constipation.

## 2022-08-24 LAB — TM INTERPRETATION: SIGNIFICANT CHANGE UP

## 2022-08-26 LAB — HEMATOPATHOLOGY REPORT: SIGNIFICANT CHANGE UP

## 2022-08-29 ENCOUNTER — APPOINTMENT (OUTPATIENT)
Dept: UROLOGY | Facility: CLINIC | Age: 87
End: 2022-08-29

## 2022-08-29 DIAGNOSIS — N20.0 CALCULUS OF KIDNEY: ICD-10-CM

## 2022-08-29 DIAGNOSIS — D49.59 NEOPLASM UNSPECIFIED BEHAVIOR OF OTHER GENITOURINARY ORGAN: ICD-10-CM

## 2022-08-29 DIAGNOSIS — N50.9 DISORDER OF MALE GENITAL ORGANS, UNSPECIFIED: ICD-10-CM

## 2022-08-29 DIAGNOSIS — N13.30 UNSPECIFIED HYDRONEPHROSIS: ICD-10-CM

## 2022-08-29 DIAGNOSIS — R35.1 NOCTURIA: ICD-10-CM

## 2022-08-29 DIAGNOSIS — N43.3 HYDROCELE, UNSPECIFIED: ICD-10-CM

## 2022-08-29 DIAGNOSIS — I89.0 LYMPHEDEMA, NOT ELSEWHERE CLASSIFIED: ICD-10-CM

## 2022-08-29 PROBLEM — Z95.0 PRESENCE OF CARDIAC PACEMAKER: Chronic | Status: ACTIVE | Noted: 2020-06-14

## 2022-08-29 PROBLEM — E78.5 HYPERLIPIDEMIA, UNSPECIFIED: Chronic | Status: ACTIVE | Noted: 2022-08-16

## 2022-08-29 PROBLEM — I25.10 ATHEROSCLEROTIC HEART DISEASE OF NATIVE CORONARY ARTERY WITHOUT ANGINA PECTORIS: Chronic | Status: ACTIVE | Noted: 2020-06-14

## 2022-08-29 PROBLEM — I10 ESSENTIAL (PRIMARY) HYPERTENSION: Chronic | Status: ACTIVE | Noted: 2022-08-16

## 2022-08-29 PROCEDURE — 99442: CPT | Mod: 95

## 2022-09-02 PROBLEM — N20.0 NEPHROLITHIASIS: Status: ACTIVE | Noted: 2020-06-18

## 2022-09-02 PROBLEM — N13.30 HYDRONEPHROSIS OF LEFT KIDNEY: Status: ACTIVE | Noted: 2020-06-22

## 2022-09-02 PROBLEM — I89.0 LYMPHEDEMA: Status: ACTIVE | Noted: 2020-08-09

## 2022-09-02 PROBLEM — R35.1 NOCTURIA: Status: ACTIVE | Noted: 2020-07-13

## 2022-09-02 PROBLEM — N50.9 TESTICLE TROUBLE: Status: ACTIVE | Noted: 2022-07-18

## 2022-09-02 PROBLEM — D49.59: Status: ACTIVE | Noted: 2022-07-27

## 2022-09-02 PROBLEM — N43.3 LEFT HYDROCELE: Status: ACTIVE | Noted: 2022-07-19

## 2022-09-02 LAB — CHROM ANALY OVERALL INTERP SPEC-IMP: SIGNIFICANT CHANGE UP

## 2022-09-02 NOTE — ADDENDUM
[FreeTextEntry1] : This note was authored by Melita Hodges working as a scribe for Dr.Gary Gerber. I, Dr. Ten Gerber have reviewed the content of this note and confirm it is true and accurate. I personally performed the history and physical examination and made all the decisions 08/29/2022.

## 2022-09-02 NOTE — HISTORY OF PRESENT ILLNESS
[FreeTextEntry1] : 07/19/2022:  is a 89 y/o M that presents today for an evaluation of enlarged testicle. He reports that his left testicle was became enlarged about 5 weeks ago. There is no pain associated with this. Denies any groin pain. Denied doing any heavy lifting at the time it started to occur. The pt was told by his cardiologist a few years ago, Dr.Gary Hernandez at [facility], that he has an enlarged prostate but this hasn't bothered him. He denies burning, gross hematuria, pushing, straining, urinary frequency, and urinary urgency. Has constipation but manages with with maalox. 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 Has 7 stents. Takes plavix. Pt was  but his wife passed away. He has been with a domestic partner for about 20 years. His partner has a hx of a lung transplant done at [place] and is currently hospitalized at [facility]. Pt is sexually active every once in a while. The patient does not have any significant health problems. Does have neuropathy. +PMHx of kidney stones, only one episode and passed the stone within a day and a half. Drinks about 2 500 ml water bottles a day, a 500 ml bottle of Gatorade, and one cup of coffee in 24 hours. 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 \par \par 07/28/2022:  was scheduled for a telehealth visit today. Unfortunately, I was not able to see the patient. Pt was called by my office staff and informed of this. He was very upset and says he has been trying to call my office for days and trying to get in touch with me. My office staff apologized and assured him he would be rescheduled for tomorrow. \par \par 07/29/2022:  presents today for a follow up audio only telehealth visit. He is accompanied by his daughter. I apologized profusely for not being able to see him yesterday and for his multiple attempts to reach me as I was unaware of these attempts. He was receptive and understood. Pt obtained a scrotal US on 7/27/2022. 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 Left testis measures 6.9 cm x 4.9 cm x 5.6 cm. Right testis is 4.3 cm x 2.1 cm x  2.6 cm. Multiple low-attenuation vascular right testicular foci. Differential diagnosis includes leukemia/lymphoma or metastasis from primary neoplasm such as prostate. Primary left testicular neoplasm may also be considered but less likely in view of the findings in the right testis. Alpha fetoprotein was within normal range. HCG tumor marker was 2 where it should be below 1. This is a slight elevation. Lactate dehydrogenase was substantially elevated. It was these findings that reinforced my request of the US that was ordered on day of physical exam. The patient's testosterone level was low, total 78.7 and free at 0.8. Pt originally noticed the testicle started to become enlarged about 6 weeks ago. It has been fairly stable in size, maybe some minimal growth. \par \par 08/02/2022:  presents today for a telehealth visit. \par 08/2/2022 Audio visual telehealth, Patient and daughter in [place], [place], Dr. Ten Gerber in [place], [place].\par \par Patient obtained a CT Urogram w/wo contrast as well as CT chest w/ iv contrast on 7/29/2022. CT demonstrated no abdominal or pelvic adenopathy or evidence of metastatic disease in the chest, abdomen and pelvis. \par Clinical findings and CT results were reviewed at length with the patient. \par Re: MIRA BLACKMON (MIRA BLACKMON) \par 28-Apr-1931(89y)M\par Left hydrocele precluded palpation of anterior and lateral aspect of enlarged left testis. 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 CLARY normal texture prostate, no nodules. PSA=2.42, KNE=497 (), AFP=2.7 ng/ml (<=8.3,), HCG =2  mIU/ml ( <=1), CBC slight anemia, WBC count and differential Normal. CT Chest abdomen and pelvis no adenopathy, no metastases, no identifiable primary malignancy\par Left hydrocele precluded palpation of anterior and lateral aspect of enlarged left testis. 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 CLARY normal texture prostate, no nodules. PSA=2.42, WOJ=276 (), AFP=2.7 ng/ml (<=8.3,), HCG =2  mIU/ml ( <=1), CBC slight anemia, WBC count and differential Normal. CT Chest abdomen and pelvis no adenopathy, no metastases, no identifiable primary malignancy.\par \par 08/29/2022:  presents today for a telehealth visit. Audio visual telehealth visit was attempted, however pt declined and preferred a regular telephone visit. The patient is s/p left radical orchiectomy performed by Dr.Michael Thrasher on 8/22/2022. 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 The patient has been feeling well. Feels his energy levels are good.

## 2022-09-02 NOTE — ASSESSMENT
[FreeTextEntry1] : 07/19/2022:  is a 89 y/o M that presents today for an evaluation of enlarged testicle. He reports that his left testicle was became enlarged about 5 weeks ago. There is no pain associated with this. Denies any groin pain. Denied doing any heavy lifting at the time it started to occur. The pt was told by his cardiologist a few years ago, Dr.Gary Hernandez at [facility], that he has an enlarged prostate but this hasn't bothered him. He denies burning, gross hematuria, pushing, straining, urinary frequency, and urinary urgency. Has constipation but manages with with maalox. 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 Has 7 stents. Takes plavix. Pt was  but his wife passed away. He has been with a domestic partner for about 20 years. His partner has a hx of a lung transplant done at [place] and is currently hospitalized at [facility]. Pt is sexually active every once in a while. The patient does not have any significant health problems. Does have neuropathy. +PMHx of kidney stones, only one episode and passed the stone within a day and a half. Drinks about 2 500 ml water bottles a day, a 500 ml bottle of Gatorade, and one cup of coffee in 24 hours. 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 \par \par PE: Pt is circumcised. Most of the penile shaft is buried in upper part of the scrotum in the mons pubis. Varicocele on the left. Right testes is a normal size, about 18 cc, non tender, no masses, epididymis is non tender without masses. No right varicocele. No inguinal hernias. No femoral adenopathy. Left hemiscrotum is enlarged. It feels it has fluid in it, like a hydrocele. Posterior portion is palpable, non tender. Epididymis is non tender. I cannot feel the lateral or anterior aspects. Compliance is high but as you press further, it decreases rapidly. Consistent with a hydrocele. It is all non tender, not inflamed. Scrotal skin is not red or warm. \par The standing and bent over position was utilized for the CLARY. Digital rectal exam found no suspicious rectal masses. Normal seminal vesicles. Anal tone is normal. The prostate is non tender, with normal texture, discrete borders, and no nodules. It is a 35 gram transurethral resection size. No rectal mucosal lesions. No gross blood on the examining finger. \par The patient produced a urine sample which will be sent for urinalysis, urine cytology, and urine culture. \par Blood work today included alpha fetoprotein, CBC wit diff, CMP, HCG, lactate dehydrogenase, magnesium, serum osmolality, phosphorus, PSA profile, testosterone free and total, and uric acid serum. \par I advised the patient to increase his liquid intake by adding one more 500 ml water bottle to his daily consumption. \par I am sending the patient for a scrotal US. \par Patient will RTO after he obtains the scrotal US to review the results. \par \par 07/28/2022:  was scheduled for a telehealth visit today. Unfortunately, I was not able to see the patient. Pt was called by my office staff and informed of this. He was very upset and says he has been trying to call my office for days and trying to get in touch with me. My office staff apologized and assured him he would be rescheduled for tomorrow. \par \par 07/29/2022:  presents today for a follow up audio only telehealth visit. He is accompanied by his daughter. I apologized profusely for not being able to see him yesterday and for his multiple attempts to reach me as I was unaware of these attempts. He was receptive and understood. Pt obtained a scrotal US on 7/27/2022. 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 Left testis measures 6.9 cm x 4.9 cm x 5.6 cm. Right testis is 4.3 cm x 2.1 cm x  2.6 cm. Multiple low-attenuation vascular right testicular foci. Differential diagnosis includes leukemia/lymphoma or metastasis from primary neoplasm such as prostate. Primary left testicular neoplasm may also be considered but less likely in view of the findings in the right testis. Alpha fetoprotein was within normal range. HCG tumor marker was 2 where it should be below 1. This is a slight elevation. Lactate dehydrogenase was substantially elevated. It was these findings that reinforced my request of the US that was ordered on day of physical exam. The patient's testosterone level was low, total 78.7 and free at 0.8. Pt originally noticed the testicle started to become enlarged about 6 weeks ago. It has been fairly stable in size, maybe some minimal growth. \par \par Clinical findings and US results were reviewed at length with the patient. Lab work of 7/18/2022 was explained it detail as well. While this could be a germ cell neoplasm, it is unlikely at his age. If it is spermatocytic seminoma, one does not see focal vascular lesions in contralateral testes. Prostate cancer can metastasize to testes and produce such a picture. I explained that it is quite possible that this is a leukemia or lymphoma. Given these possibilities, I believe the place to start is with a CT chest abdomen and pelvis to check for enlarged lymph nodes. If there was no sign of regional or distal spread I recommend inguinal orchiectomy to establish diagnosis. If there are bony metastases, I would also get NM bone scan. After explaining all of this, his daughter recapped what we discussed so she understood well. Pt's daughter is from [place] and wanted to understand the timeline of things. \par Patient will schedule a telehealth visit a few days after he obtains the CT so we can review and discuss the results. \par \par 8/2/2022 Audio visual telehealth, Patient and daughter in [place], [place], Dr. Ten Gerber in [place], [place].\par Re: MIRA BLACKMON (MIRA BLACKMON G) \par 28-Apr-1931(89y)M\par Left hydrocele precluded palpation of anterior and lateral aspect of enlarged left testis. 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 CLARY normal texture prostate, no nodules. PSA=2.42, NED=082 (), AFP=2.7 ng/ml (<=8.3,), HCG =2  mIU/ml ( <=1), CBC slight anemia, WBC count and differential Normal. CT Chest abdomen and pelvis no adenopathy, no metastases, no identifiable primary malignancy.\par \par Findings reviewed with patient and daughter. I suggested left radical orchiectomy with frozen section. If frozen section shows malignancy then right radical orchiectomy if benign biopsy right testis. Patient not interested in testicular prosthesis. Risks benefits, alternatives reviewed.\par Patient referred to Dr. Bruno Haywood.\par \par 08/29/2022:  presents today for a telehealth visit. Audio visual telehealth visit was attempted, however pt declined and preferred a regular telephone visit. The patient is s/p left radical orchiectomy performed by Dr.Michael Thrasher on 8/22/2022. 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 The patient has been feeling well. Feels his energy levels are good. \par \par Reviewed and discussed pathology report. Pt will be consulting with Dr.Joanna Ya of oncology. The patient is in good spirits. \par \par Duration of telephone visit: 11 minutes.

## 2022-09-07 ENCOUNTER — APPOINTMENT (OUTPATIENT)
Dept: HEMATOLOGY ONCOLOGY | Facility: CLINIC | Age: 87
End: 2022-09-07

## 2022-09-07 ENCOUNTER — NON-APPOINTMENT (OUTPATIENT)
Age: 87
End: 2022-09-07

## 2022-09-07 VITALS
DIASTOLIC BLOOD PRESSURE: 80 MMHG | SYSTOLIC BLOOD PRESSURE: 140 MMHG | HEART RATE: 68 BPM | OXYGEN SATURATION: 99 % | WEIGHT: 201 LBS | BODY MASS INDEX: 27.26 KG/M2

## 2022-09-07 PROCEDURE — 85025 COMPLETE CBC W/AUTO DIFF WBC: CPT | Mod: GC

## 2022-09-07 PROCEDURE — 99205 OFFICE O/P NEW HI 60 MIN: CPT | Mod: GC,25

## 2022-09-08 LAB
ALBUMIN SERPL ELPH-MCNC: 4.6 G/DL
ALP BLD-CCNC: 71 U/L
ALT SERPL-CCNC: 25 U/L
ANION GAP SERPL CALC-SCNC: 13 MMOL/L
AST SERPL-CCNC: 23 U/L
BILIRUB SERPL-MCNC: 0.6 MG/DL
BUN SERPL-MCNC: 30 MG/DL
CALCIUM SERPL-MCNC: 9.7 MG/DL
CHLORIDE SERPL-SCNC: 106 MMOL/L
CO2 SERPL-SCNC: 24 MMOL/L
CREAT SERPL-MCNC: 1.13 MG/DL
EGFR: 61 ML/MIN/1.73M2
FERRITIN SERPL-MCNC: 689 NG/ML
FOLATE SERPL-MCNC: 10.2 NG/ML
GLUCOSE SERPL-MCNC: 98 MG/DL
HBV CORE IGG+IGM SER QL: NONREACTIVE
HBV SURFACE AB SER QL: NONREACTIVE
HBV SURFACE AG SER QL: NONREACTIVE
HIV1+2 AB SPEC QL IA.RAPID: NONREACTIVE
INR PPP: 1.01 RATIO
IRON SATN MFR SERPL: 19 %
IRON SERPL-MCNC: 46 UG/DL
LDH SERPL-CCNC: 183 U/L
MAGNESIUM SERPL-MCNC: 2.3 MG/DL
PHOSPHATE SERPL-MCNC: 3.6 MG/DL
POTASSIUM SERPL-SCNC: 4.6 MMOL/L
PROT SERPL-MCNC: 6.7 G/DL
PT BLD: 11.8 SEC
SODIUM SERPL-SCNC: 142 MMOL/L
TIBC SERPL-MCNC: 246 UG/DL
UIBC SERPL-MCNC: 200 UG/DL
URATE SERPL-MCNC: 4.8 MG/DL
VIT B12 SERPL-MCNC: 515 PG/ML

## 2022-09-08 NOTE — HISTORY OF PRESENT ILLNESS
[de-identified] : Mr. Prince is a 89 yo male who presents today to establish care for newly diagnosed DLBCL. PMHx significant for CAD (hx of MI, pacemaker and cardiac stents)\par \par Pt noted enlarged left testicle 5/2022, he followed up with Dr. Gerber (Urology) and had a scrotal ultrasound done 7/27/22 which showed- Left testis: 6.9 cm x 4.9 cm x 5.6 cm. Testis replaced by heterogeneous vascular neoplasm. Right testis: 4.3 cm x 2.1 cm x  2.6 cm. Multiple low-attenuation masses with vascularity. He then had CT of chest/abd/pelvis done 7/30/22 with No abdominal or pelvic adenopathy or evidence of metastatic disease in the chest, abdomen and pelvis. Pt was referred to Dr. Thrasher and underwent left radical orchiectomy on 8/22/22. Pathology was consistent with Diffuse large B cell lymphoma with high proliferation index, non-germinal center B-cell-like immunophenotype.\par \par He denies fever/chills, night sweats or unintentional weight loss. Pt is healing well from surgery and denies left scrotal pain. Pt plans on moving to [facility] where his significant other is currently staying.

## 2022-09-08 NOTE — RESULTS/DATA
[FreeTextEntry1] : CBC done 9/7/22\par WBC- 6.4\par hgb- 12.2\par plt- 223\par alc- 1.16\par anc- 4.11

## 2022-09-08 NOTE — ASSESSMENT
[FreeTextEntry1] : Mr. Prince is a 89 yo male who presents today to establish care for newly diagnosed DLBCL.\par \par Pt noted enlarged left testicle 6/2022, he followed up with Dr. Gerber (Urology) and had a scrotal ultrasound done 7/27/22 which showed- Left testis: 6.9 cm x 4.9 cm x 5.6 cm. Testis replaced by heterogeneous vascular neoplasm. Right testis: 4.3 cm x 2.1 cm x  2.6 cm. Multiple low-attenuation masses with vascularity. He then had CT of chest/abd/pelvis done 7/30/22 with No abdominal or pelvic adenopathy or evidence of metastatic disease in the chest, abdomen and pelvis. Pt was referred to Dr. Thrasher and underwent left radical orchiectomy on 8/22/22. Pathology was consistent with Diffuse large B cell lymphoma with high proliferation index, non-germinal center B-cell-like immunophenotype    \par \par #DLBCL\par - Had a long discussion with patient and his daughter Mattie regarding the nature of the disease and potential therapy. We discussed that this is an aggressive lymphoma and without treatment may have CNS spread and be potentially fatal. Possible treatment with R- Mini CHOP was discussed, along with radiation to right testes and CNS prophylaxis with IT MTX/HD MTX was discussed as well.\par -PET/CT scan ordered and will be scheduled in the next week\par - Pt will need port placement should he decide if he wants to pursue therapy\par -Pt may pursue a 2nd opinion as he's unsure if he wants treatment; In addition his partner Alice is living in [facility] in [place] where patient plans to move, so he may want therapy closer to home\par -F/ in 1 week after PET/CT scans \par

## 2022-09-08 NOTE — END OF VISIT
[FreeTextEntry3] : Patient seen and case discussed with CHRISTINA Navarro. I agree with above and have edited the note where needed.\par  [Time Spent: ___ minutes] : I have spent [unfilled] minutes of time on the encounter.

## 2022-09-09 ENCOUNTER — APPOINTMENT (OUTPATIENT)
Dept: UROLOGY | Facility: CLINIC | Age: 87
End: 2022-09-09

## 2022-09-17 ENCOUNTER — APPOINTMENT (OUTPATIENT)
Dept: NUCLEAR MEDICINE | Facility: IMAGING CENTER | Age: 87
End: 2022-09-17

## 2022-09-17 ENCOUNTER — OUTPATIENT (OUTPATIENT)
Dept: OUTPATIENT SERVICES | Facility: HOSPITAL | Age: 87
LOS: 1 days | End: 2022-09-17
Payer: MEDICARE

## 2022-09-17 DIAGNOSIS — C83.30 DIFFUSE LARGE B-CELL LYMPHOMA, UNSPECIFIED SITE: ICD-10-CM

## 2022-09-17 PROCEDURE — 78815 PET IMAGE W/CT SKULL-THIGH: CPT | Mod: 26,PI,MH

## 2022-09-17 PROCEDURE — 78815 PET IMAGE W/CT SKULL-THIGH: CPT

## 2022-09-17 PROCEDURE — A9552: CPT

## 2022-09-20 ENCOUNTER — APPOINTMENT (OUTPATIENT)
Dept: HEMATOLOGY ONCOLOGY | Facility: CLINIC | Age: 87
End: 2022-09-20

## 2022-09-20 DIAGNOSIS — C83.30 DIFFUSE LARGE B-CELL LYMPHOMA, UNSPECIFIED SITE: ICD-10-CM

## 2022-09-20 PROCEDURE — 99215 OFFICE O/P EST HI 40 MIN: CPT | Mod: 95

## 2022-09-20 NOTE — HISTORY OF PRESENT ILLNESS
[de-identified] : Mr. Prince is a 89 yo male who presents today to establish care for newly diagnosed DLBCL. PMHx significant for CAD (hx of MI, pacemaker and cardiac stents)\par \par Pt noted enlarged left testicle 5/2022, he followed up with Dr. Gerber (Urology) and had a scrotal ultrasound done 7/27/22 which showed- Left testis: 6.9 cm x 4.9 cm x 5.6 cm. Testis replaced by heterogeneous vascular neoplasm. Right testis: 4.3 cm x 2.1 cm x  2.6 cm. Multiple low-attenuation masses with vascularity. He then had CT of chest/abd/pelvis done 7/30/22 with No abdominal or pelvic adenopathy or evidence of metastatic disease in the chest, abdomen and pelvis. Pt was referred to Dr. Thrasher and underwent left radical orchiectomy on 8/22/22. Pathology was consistent with Diffuse large B cell lymphoma with high proliferation index, non-germinal center B-cell-like immunophenotype.\par \par He denies fever/chills, night sweats or unintentional weight loss. Pt is healing well from surgery and denies left scrotal pain. \par Cardiologist- Dr. Ten Hernandez ([facility]) [de-identified] : 9/20/22: Since his last visit, he continues to feel well without any B symptoms. His scrotal swelling is stable. He moved to [facility] living which is in [place]. They are looking into a second opinion at [facility] as it may be easier to obtain treatment there. \par \par He underwent PET/CT yesterday which demonstrated FDG avid uptake in bilateral testis, L adrenal nodule and ?bilateral distal ureters. He has no lymphadenopathy.

## 2022-09-20 NOTE — ASSESSMENT
[FreeTextEntry1] : Mr. Prince is a 89 yo male who presents today to establish care for newly diagnosed DLBCL.\par \par Pt noted enlarged left testicle 6/2022, he followed up with Dr. Gerber (Urology) and had a scrotal ultrasound done 7/27/22 which showed- Left testis: 6.9 cm x 4.9 cm x 5.6 cm. Testis replaced by heterogeneous vascular neoplasm. Right testis: 4.3 cm x 2.1 cm x  2.6 cm. Multiple low-attenuation masses with vascularity. He then had CT of chest/abd/pelvis done 7/30/22 with No abdominal or pelvic adenopathy or evidence of metastatic disease in the chest, abdomen and pelvis. Pt was referred to Dr. Thrasher and underwent left radical orchiectomy on 8/22/22. Pathology was consistent with Diffuse large B cell lymphoma with high proliferation index, non-germinal center B-cell-like immunophenotype    \par \par #DLBCL\par - Had a long discussion with patient and his daughter Mattie regarding the nature of the disease and potential therapy. We discussed that this is an aggressive lymphoma and without treatment may have CNS spread and be potentially fatal. Possible treatment with R- Mini CHOP was discussed, along with radiation to bilateral testes with CNS ppx. \par -recommend MRI brain and LP with IT MTX, discussed potential side effects \par -will need port placement prior to chemotherapy \par -PET/CT scan ordered and will be scheduled in the next week\par -Pt may pursue a 2nd opinion as he's unsure if he wants treatment; In addition his partner Alice is living in [facility] in [place] where patient plans to move, so he may want therapy closer to home, are scheduled on October 6 with Dr Salazar at [facility] \par \par

## 2022-09-27 ENCOUNTER — APPOINTMENT (OUTPATIENT)
Dept: MRI IMAGING | Facility: CLINIC | Age: 87
End: 2022-09-27

## 2023-04-26 NOTE — ED ADULT NURSE NOTE - CHPI ED NUR SYMPTOMS NEG
(3) 3 to less than 7 years old no hematuria/no fever/no dysuria/no diarrhea/no blood in stool/no burning urination/no chills

## 2024-01-20 NOTE — DISCHARGE NOTE PROVIDER - NSDCACTIVITY_GEN_ALL_CORE
Clothing Showering allowed/Stairs allowed/Walking - Indoors allowed/No heavy lifting/straining/Walking - Outdoors allowed No restrictions/Showering allowed/Stairs allowed/Walking - Indoors allowed/No heavy lifting/straining/Walking - Outdoors allowed

## 2024-08-22 NOTE — INPATIENT CERTIFICATION FOR MEDICARE PATIENTS - IN ORDER TO MEET MEDICARE REQUIREMENTS.
Sedation or General Anesthesia, Adult  Care After  Refer to this sheet in the next 24 hours. These instructions provide you with information on caring for yourself after your procedure. Your caregiver may also give you more specific instructions. Your treatment has been planned according to current medical practices, but problems sometimes occur. Call your caregiver if you have any problems or questions after your procedure.   HOME CARE INSTRUCTIONS   Do not participate in any activities that require you to be alert or coordinated. Do not:  Drive.  Swim.  Ride a bicycle.  Operate heavy machinery.  Cook.  Use power tools.  Climb ladders.  Work at heights.  Take a bath.  Do not drink alcohol.  Do not make any important decisions or sign legal documents.  Stay with an adult.  The first meal following your procedure should be light and small. Avoid solid foods if you feel sick to your stomach (nauseous) or if you throw up (vomit).  Drink enough fluids to keep your urine clear or pale yellow.  Only take your usual medicines or new medicines if your caregiver approves them.  Only take over-the-counter or prescription medicines for pain, discomfort, or fever as directed by your caregiver.  Keep all follow-up appointments as directed by your caregiver.  SEEK IMMEDIATE MEDICAL CARE IF:   You are not feeling normal or behaving normally after 24 hours.  You have persistent nausea and vomiting.  You are unable to drink fluids or eat food.  You have difficulty urinating.  You have difficulty breathing or speaking.  You have blue or gray skin.  There is difficulty waking or you cannot be woken up.  You have heavy bleeding, redness, or a lot of swelling where the sedative or anesthesia entered your skin (intravenous site).  You have a rash.  MAKE SURE YOU:  Understand these instructions.  Will watch your condition.  Will get help right away if you are not doing well or get worse.  Document Released: 12/18/2006 Document Revised:  06/18/2013 Document Reviewed: 04/17/2013  [employer]® Patient Information ©2013 [employer].    EGD DISCHARGE INSTRUCTIONS    Activity:  Rest today. No driving, operating machinery, or making any important decisions today. May resume normal activity tomorrow.    Diet:  Following EGD eat slowly, chew food well, cut food into small pieces-Avoid greasy, spicy, \"crunchy\" foods X 48 hours.     Call your Doctor if you have any of the following:  -Passing blood rectally or vomiting blood (it may be red or black).  -Persistent nausea/vomiting  -Severe abdominal or chest pain not relieved with passing gas  -Fever of 100 degrees or more  -Redness or swelling at the IV site  -Severe sore throat or neck pain   In order to meet Medicare requirements, the clinical documentation must support the information cited in the admission order.  Please be sure to provide detailed and clear documentation about the following in the admitting note/history and physical:

## 2024-09-03 NOTE — ASU PATIENT PROFILE, ADULT - HISTORY OF COVID-19 VACCINATION
"Daily Note     Today's date: 9/3/2024  Patient name: Romero Guerin  : 1966  MRN: [id number]  Referring provider: Maddie Lake, PT  Dx:   Encounter Diagnosis     ICD-10-CM    1. Left wrist pain  M25.532                      Subjective: Pt reports no changes.       Objective: See treatment diary below      Assessment: Tolerated treatment well. Exercises limited to sub-pain threshold.       Plan: Continue per plan of care.      Precautions: standard      Manuals 8/22 9/3           Laser TFCC  16W 4'           STM TFCC  KT                                     Neuro Re-Ed                                                                                                        Ther Ex             Gripper  5\"x20           Towel sq  5\"x20           Iso wrist ext, RD, UD  10\"x10 ea           Therabar ext  2x10 yellow                                                  Pt ed HEP rev KT            Ther Activity                                       Gait Training                                       Modalities                                            " Yes

## 2025-02-17 NOTE — ED CDU PROVIDER SUBSEQUENT DAY NOTE - HISTORY
Scheduled date of colonoscopy (as of today): 3/25/25  Physician performing colonoscopy: Dr. Espinal  Location of colonoscopy: [facility]  Bowel prep reviewed with patient: Miralax/Dulcolax  Instructions reviewed with patient by: N.M.  Clearances: NJONO    
CDU PROGRESS NOTE PA DOMINGO: No interval change from previous note, Pt resting comfortably, without complaint. Abdomen soft, non distended, no guarding or rebound noted. No CVAT. Will continue to monitor.

## (undated) DEVICE — BASIN SET DOUBLE

## (undated) DEVICE — SPONGE PEANUT AUTO COUNT

## (undated) DEVICE — DRSG STERISTRIPS 0.25 X 3"

## (undated) DEVICE — SUT CHROMIC 3-0 27" SH

## (undated) DEVICE — PACK GENERAL MINOR

## (undated) DEVICE — SUT VICRYL 2-0 27" SH UNDYED

## (undated) DEVICE — SUT CHROMIC 2-0 27" SH

## (undated) DEVICE — WARMING BLANKET FULL ADULT

## (undated) DEVICE — SOL IRR POUR H2O 1500ML

## (undated) DEVICE — VENODYNE/SCD SLEEVE CALF MEDIUM

## (undated) DEVICE — DRAPE TOWEL BLUE 17" X 24"

## (undated) DEVICE — DRAIN PENROSE .25" X 12" SILICONE

## (undated) DEVICE — DURABLE MEDICAL EQUIPMENT: Type: DURABLE MEDICAL EQUIPMENT

## (undated) DEVICE — SUT MONOCRYL 3-0 18" PS-2 UNDYED

## (undated) DEVICE — GOWN XL

## (undated) DEVICE — SUT VICRYL 4-0 27" RB-1 UNDYED

## (undated) DEVICE — GLV 7 PROTEXIS (CREAM) MICRO

## (undated) DEVICE — SUT VICRYL 3-0 27" SH UNDYED

## (undated) DEVICE — SOL IRR BAG NS 0.9% 3000ML

## (undated) DEVICE — DRAPE 3/4 SHEET 52X76"

## (undated) DEVICE — POSITIONER STRAP ARMBOARD VELCRO TS-30

## (undated) DEVICE — SUT VICRYL 0 27" CT-1 UNDYED

## (undated) DEVICE — DRAIN PENROSE 0.5X12" SILICONE